# Patient Record
Sex: FEMALE | Race: BLACK OR AFRICAN AMERICAN | NOT HISPANIC OR LATINO | Employment: STUDENT | URBAN - METROPOLITAN AREA
[De-identification: names, ages, dates, MRNs, and addresses within clinical notes are randomized per-mention and may not be internally consistent; named-entity substitution may affect disease eponyms.]

---

## 2022-01-06 ENCOUNTER — PATIENT MESSAGE (OUTPATIENT)
Dept: ADMINISTRATIVE | Facility: OTHER | Age: 37
End: 2022-01-06

## 2022-01-06 ENCOUNTER — LAB VISIT (OUTPATIENT)
Dept: PRIMARY CARE CLINIC | Facility: OTHER | Age: 37
End: 2022-01-06
Attending: INTERNAL MEDICINE
Payer: OTHER GOVERNMENT

## 2022-01-06 DIAGNOSIS — Z20.822 ENCOUNTER FOR LABORATORY TESTING FOR COVID-19 VIRUS: ICD-10-CM

## 2022-01-06 PROCEDURE — U0003 INFECTIOUS AGENT DETECTION BY NUCLEIC ACID (DNA OR RNA); SEVERE ACUTE RESPIRATORY SYNDROME CORONAVIRUS 2 (SARS-COV-2) (CORONAVIRUS DISEASE [COVID-19]), AMPLIFIED PROBE TECHNIQUE, MAKING USE OF HIGH THROUGHPUT TECHNOLOGIES AS DESCRIBED BY CMS-2020-01-R: HCPCS | Mod: ST72 | Performed by: INTERNAL MEDICINE

## 2022-01-11 LAB
SARS-COV-2 RNA RESP QL NAA+PROBE: NORMAL
TEST PERFORMANCE INFO SPEC: NORMAL

## 2022-08-23 ENCOUNTER — HOSPITAL ENCOUNTER (OUTPATIENT)
Dept: PHYSICAL THERAPY | Age: 37
Discharge: HOME | End: 2022-08-23
Payer: COMMERCIAL

## 2022-08-23 PROCEDURE — 97162 PT EVAL MOD COMPLEX 30 MIN: CPT

## 2022-08-23 PROCEDURE — 97535 SELF CARE MNGMENT TRAINING: CPT

## 2022-08-23 NOTE — PROGRESS NOTES
[facility]  [address], [address]  Ph [phone]  Fx [phone]    Plan of Care/ Statement of Necessity for Physical Therapy Services    Patient name: Manan Martinez Start of Care: 2022   Referral source: Basia Hurtado MD : 1985    Medical Diagnosis: Other signs and symptoms involving the genitourinary system R39. 8   Onset Date: 2020    Treatment Diagnosis: Other signs and symptoms involving the genitourinary system R39. 8   Prior Hospitalization: see medical history Provider#: [id number]   Medications: Verified on Patient summary List    Comorbidities: endometriosis    Prior Level of Function: Able to have intercourse without pain, no urinary urgency              The Plan of Care and following information is based on the information from the initial evaluation. Assessment/ key information: Patient is a 29year old female presenting to Physical Therapy with c/o urge urinary incontinence and pelvic pain which is limiting ability function at previous level. Patient has pelvic pain complaints with pelvic examinations, intercourse, and occasionally with ADLs. Patient presents with hypertonicity of pelvic floor and scar tissue restrictions at umbilicus. Patient has history of endometriosis require extensive surgery. 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 Patient presents with decreased understanding of bladder and bowel anatomy/function, dietary irritants, and urge suppression. Patient would benefit from skilled therapeutic intervention to optimize highest functional level possible.      Evaluation Complexity History MEDIUM  Complexity : 1-2 comorbidities / personal factors will impact the outcome/ POC ; Examination MEDIUM Complexity : 3 Standardized tests and measures addressing body structure, function, activity limitation and / or participation in recreation  ;Presentation MEDIUM Complexity : Evolving with changing characteristics  ; Clinical Decision Making MEDIUM Complexity : FOTO score of 26-74 FOTO score = an established functional score where 100 = no disability  Overall Complexity Rating: MEDIUM    Problem List: Pelvic pain/dysfunction, Decreased pelvic floor mm awareness, Decreased pelvic floor mm strength, Use of accessory muscles, Improper voiding habits, Hypertonus of pelvic floor, and Urinary urgency  Treatment Plan may include any combination of the following: Therapeutic exercise, Urge suppression techniques, Neuromuscular re-education, Manual therapy, Physical agent/modality, and Patient education  Patient / Family readiness to learn indicated by: asking questions  Persons(s) to be included in education: patient (P)  Barriers to Learning/Limitations: None  Measures taken if barriers to learning: N/a  Patient Goal (s): I want to learn why my body does the things it does  Patient Self Reported Health Status: good  Rehabilitation Potential: good    Short term goals: To be achieved in 6 weeks:  Patient will demonstrate proper dietary/fluid habits and urge suppression strategies that promote bladder and bowel health to aid in management of urinary urgency and incontinence. Status at eval: Patient consuming 40-80 oz of water and unaware of bladder fitness, dietary irritants and urge suppression strategies. Patient will report improvement in UI complaints evidence by a decrease in pad usage and/or amount of leakage by 25% to improve QOL. Status at eval: Patient using 1-3 pads (pantiliners) per day, generally dry-damp (amount of leakage). Patient will be able to fully relax pelvic floor to baseline with sensations of release to assist in pelvic floor hypertonicity training. Status at eval: Patient has poor release with no sensation of release following pelvic floor activation.      Patient will demonstrate proper toileting posture and techniques to aid in bowel movement, with 50% decrease in report of pain and straining. Status at eval: standard toileting posture, pain and pressure with bowel movements     Pt will demonstrate proper toileting posture and perform colon massage daily for improved bowel motility to improve QOL and prevent weakening of PFM due to straining with BMs. Status at eval: BM every 1x/week, Type 1     Long term goals: To be achieved in 12 weeks:  Patient will report bladder continence 100% of the time with cough/sneeze/laugh and walking to the toilet. Status at eval: patient stress and urgency incontinence 1-2 times  per week     Patient will tolerate size 3 dilator, dynamic insertion for 10 minutes (or intercourse) with pain no more than 4/10  Status at eval: digital insertion for pelvic floor examination elicits 9/80 pain, intercourse 5/10 pain      Patient will demonstrate improvement of current complaints evidenced by a 9 point  improvement in FOTO, Pelvic functional disability index score  Status at Eval: Pelvic functional disability index urinary 56     Patient will demonstrate independence with management tools and exercise program that are beneficial for current condition in order to feel comfortable with Pelvic floor PT D/C and not fear exacerbation of current condition or symptoms returning. Status at eval : patient fearful of return to exercise and unaware of what activities to avoid to avoid exacerbation of current condition    Frequency / Duration: Patient to be seen 1 times per week for 12 weeks. Patient/ Caregiver education and instruction: Diagnosis, prognosis, Proper Voiding Habits, Diet, Pain Management, Exercises, and Bladder Retraining   [x]  Plan of care has been reviewed with JITENDRA Burks, PT 8/23/2022 6:38 PM    ________________________________________________________________________    I certify that the above Therapy Services are being furnished while the patient is under my care.  I agree with the treatment plan and certify that this therapy is necessary.     Physician's Signature:____________________  Date:____________Time:____________                                      Dana Mckenna MD      Please sign and return to [facility]  [address], [address]  Ph [phone]  Fx [phone]

## 2022-08-23 NOTE — PROGRESS NOTES
Physical Therapy Evaluation      Patient Name: Mono Kidd  Date:2022  : 1985  [x]  Patient  Verified  Payor: BLUE CROSS / Plan: [employer] [id number] PPO / Product Type: PPO /    In time:5:06  Out time:5:50  Total Treatment Time (min): 44  Visit #: 1 of 12    Medicare/BCBS Only   Total Timed Codes (min):  14 1:1 Treatment Time:  44       Treatment Area: Pelvic pain [R10.2]    SUBJECTIVE  Pain Level (0-10 scale): 2/10  []constant []intermittent []improving []worsening []no change since onset    Any medication changes, allergies to medications, adverse drug reactions, diagnosis change, or new procedure performed?: [x] No    [] Yes (see summary sheet for update)  Subjective functional status/changes:     PLOF: Able to have intercourse without pain, no urinary urgency   Limitations to PLOF: Daily urinary urgency, weekly urinary leakage, pain with intercourse and pelvic examinations  Mechanism of Injury: symptoms worsened after endometrial ablation in 2020  Current symptoms/Complaints: Patient states she noticed increased urinary leakage/urgency. She states she tried to google her symptoms and starting completing pelvic floor kegels. Symptoms were not improving so she sought out a pelvic floor referral and is concerned she is too tight. She has been diagnosed with endometriosis. Patient does have pain during intercourse.  She was also told she had a tilted uterus at her last pelvic exam.    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[employer] Situation: Lives with finance   Pt Goals: \"I want to learn why my body does the things it does\"   Barriers: []pain []financial []time []transportation []other  Motivation: highly motivated  Substance use: []Alcohol []Tobacco [x]none  Cognition: A & O x 3    Other:    Current urinary complaint  leaks with urgency  urinary frequency  urinary urgency  dysuria: pain during void    Bladder complaint longevity:  1 - 3 years    Bladder symptom progression:  worsened    Frequency of UI: 1-2 times per week    Pad use:  pantiliners: 1- 2 per day    Pad wetness when changed:  Damp 1-2 times per week    Daytime urinary frequency:  Every 2-3 hour(s) during the day    Nocturia:  0-1x/ night    Patient has failed previous pelvic floor muscle training? [x] Yes    [] No    Bowel function:  Constipation,  less than 3 per week, hard stool with straining  Uses \"smooth moves\" tea x1/month to help relieve constipation  Stool Type [facility]): Type 1  Toileting position/modifications: none    Fecal Incontinence present? No    Pain complaint:  suprapubic pain/discomfort  vaginal pain: sharp, stabbing and only with intercourse, pelvic exam    Pain scale:  Verbal Analog scale: best 0/10 worst 5/10    Pain description:  worsens with: sexual activity (intercourse, ejaculation, orgasm)    Diet:    Fluid intake:    Fluid  Amount per day   Water 40-80 oz   Caffeine occasionally   Alcohol no             Physical Exam Objective Findings:    scar adhesions/restrictions noted at area where umbilicus was located  Pain present? no    Pelvic Floor Assessment  Patient was educated on pelvic floor anatomy, structure, and function and implications for current presentation of s/s. Patient consents to pelvic floor assessment.     External trigger point/ muscle tenderness:    Superficial PFM tenderness/restriction   Right Left   Bulbocavernosus (bulbospongiosus) No No   Ischiocavernosus No No   Superficial Transverse Perineal No No   Perineal body No    Levator Ani No No              PFM Screen:    Skin Integrity:  [x] Healthy [] Red  [] Labia Atrophy [] Fragile    Sensation: [x] Intact [] Diminished:    Muscle Bulk: [x] Symmetrical  [] Well-developed [] Atrophied:  []L   []R   []B    Prolapse: [] Cystocele:   [] Rectocele:  [] Uterine prolapse:     Ability to actively bulge: yes  Bulge with cough no    Pelvic floor manual exam: Performed via internal digital vaginal assessment  female-upon vaginal palpation of the pelvic floor, the following was noted: moderate hypertonicity throughout with painful palpation but tolerated  Introitus restriction/TTP (reported as hands on a clock): 4,5,7,8  Moderate TTP/resriction: deep pelvic floor    Deep PFM Tenderness/Restriction:    Right Left   pubococcygeus 4/10 4/10   Ischiococcygeus 5/10 5/10   Iliococcygeus 4/10 5/10   Obturator Internus 1/10 3/10   coccyx No             Pelvic floor MMT    PERF (Performance/Endurance/Repetitions//Flicks): 4/5 muscle strength, did not assess pelvic floor endurance secondary to hypertonicity     Pelvic muscle release pattern  1 - poor release, no sensation of release      30 min [x]Eval                  []Re-Eval       14 min Self Care: Review and handout provided on the following: PFM anatomy, structure and function as it pertains to current s/s, complaints and condition. Reviewed expectations for PFPT and POC. Rationale:  Increase awareness and understanding of current condition to improve patients ability to independently and effectively attain goals and progress towards long term management of current condition. With   [] TE   [] TA   [] neuro   [] other: Patient Education: [x] Review HEP    [] Progressed/Changed HEP based on:   [] positioning   [] body mechanics   [] transfers   [] heat/ice application    [] other:        Pain Level (0-10 scale) post treatment: 2/10    ASSESSMENT/Changes in Function: Patient is a 29year old female presenting to Physical Therapy with c/o urge urinary incontinence and pelvic pain which is limiting ability function at previous level.  Patient has pelvic pain complaints with pelvic examinations, intercourse, and occasionally with ADLs. Patient presents with hypertonicity of pelvic floor and scar tissue restrictions at umbilicus. Patient has history of endometriosis require extensive surgery. 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 Patient presents with decreased understanding of bladder and bowel anatomy/function, dietary irritants, and urge suppression. Patient would benefit from skilled therapeutic intervention to optimize highest functional level possible. Patient will continue to benefit from skilled PT services to modify and progress therapeutic interventions, address functional mobility deficits, address ROM deficits, address strength deficits, analyze and address soft tissue restrictions, analyze and cue movement patterns, analyze and modify body mechanics/ergonomics, assess and modify postural abnormalities, address imbalance/dizziness, and instruct in home and community integration to attain remaining goals. [x]  See Plan of Care  []  See progress note/recertification  []  See Discharge Summary         Progress towards goals / Updated goals:  Short term goals: To be achieved in 6 weeks:  Patient will demonstrate proper dietary/fluid habits and urge suppression strategies that promote bladder and bowel health to aid in management of urinary urgency and incontinence. Status at eval: Patient consuming 40-80 oz of water and unaware of bladder fitness, dietary irritants and urge suppression strategies. Patient will report improvement in UI complaints evidence by a decrease in pad usage and/or amount of leakage by 25% to improve QOL. Status at eval: Patient using 1-3 pads (pantiliners) per day, generally dry-damp (amount of leakage). Patient will be able to fully relax pelvic floor to baseline with sensations of release to assist in pelvic floor hypertonicity training.    Status at eval: Patient has poor release with no sensation of release following pelvic floor activation. Patient will demonstrate proper toileting posture and techniques to aid in bowel movement, with 50% decrease in report of pain and straining. Status at eval: standard toileting posture, pain and pressure with bowel movements    Pt will demonstrate proper toileting posture and perform colon massage daily for improved bowel motility to improve QOL and prevent weakening of PFM due to straining with BMs. Status at eval: BM every 1x/week, Type 1    Long term goals: To be achieved in 12 weeks:    Patient will report bladder continence 100% of the time with cough/sneeze/laugh and walking to the toilet. Status at eval: patient stress and urgency incontinence 1-2 times  per week    Patient will tolerate size 3 dilator, dynamic insertion for 10 minutes (or intercourse) with pain no more than 4/10  Status at eval: digital insertion for pelvic floor examination elicits 1/68 pain, intercourse 5/10 pain     Patient will demonstrate improvement of current complaints evidenced by a 9 point  improvement in FOTO, Pelvic functional disability index score  Status at Eval: Pelvic functional disability index urinary 56    Patient will demonstrate independence with management tools and exercise program that are beneficial for current condition in order to feel comfortable with Pelvic floor PT D/C and not fear exacerbation of current condition or symptoms returning.    Status at eval : patient fearful of return to exercise and unaware of what activities to avoid to avoid exacerbation of current condition    PLAN  []  Upgrade activities as tolerated     [x]  Continue plan of care  []  Update interventions per flow sheet       []  Discharge due to:_  []  Other:_      Fouzia Kwok, PT 8/23/2022  4:54 PM

## 2022-08-30 ENCOUNTER — HOSPITAL ENCOUNTER (OUTPATIENT)
Dept: PHYSICAL THERAPY | Age: 37
Discharge: HOME | End: 2022-08-30
Payer: COMMERCIAL

## 2022-08-30 PROCEDURE — 97535 SELF CARE MNGMENT TRAINING: CPT

## 2022-08-30 PROCEDURE — 97110 THERAPEUTIC EXERCISES: CPT

## 2022-08-30 PROCEDURE — 97530 THERAPEUTIC ACTIVITIES: CPT

## 2022-08-30 NOTE — PROGRESS NOTES
PF DAILY TREATMENT NOTE    Patient Name: Manan Martinez  Date:2022  : 1985  [x]  Patient  Verified  Payor: BLUE BETTY / Plan: Lux Bradford [id number] PPO / Product Type: PPO /    In time:800  Out time:849  Total Treatment Time (min): 49    For [facility]/BCBS only  Total Timed Codes (min): 49  Of Timed Code minutes, 1:1 Treatment Time: 52     Visit #: 2 of 12    Treatment Area: Pelvic pain [R10.2]    SUBJECTIVE  Pain Level (0-10 scale): 0/10  Any medication changes, allergies to medications, adverse drug reactions, diagnosis change, or new procedure performed?: [x] No    [] Yes (see summary sheet for update)  Subjective functional status/changes:   [x] No changes reported    Patient reports trying to use the belly massage and reports increased ease with defecating. OBJECTIVE        15 min Therapeutic Exercise:  [] See flow sheet :   []  Pelvic floor strengthening                 [x]  Pelvic floor downtraining  []  Quality pelvic floor contractions       [x]  Relaxation techniques  []  Urge suppression exercises  []  Other:  Rationale: increase ROM and improve coordination  to improve the patients ability to decrease pain with intercourse       25 min Therapeutic Activity:  []  See flow sheet :    []  Increase Tissue extensibility        []  Assess fiber intake    []  Assess voiding habits  []  Assess bowel habits  []  Other: Musculoskeletal assessment  Rationale: musculoskeletal assessment was completed to determine strength, coordination, and tenderness that may impact the pelvic floor            9 min Self Care: Instructed on use of vaginal trainers and wands.   XS  rigid  was given to the patient   Rationale:    increase ROM and improve coordination to improve the patients ability to decrease pain with intercourse         With   [] TE   [] TA   [] neuro  [] manual  [] self care   [] other: Patient Education: [x] Review HEP    [] Progressed/Changed HEP based on:   [] positioning   [] body mechanics   [] transfers   [] heat/ice application    [] other:      Other Objective/Functional Measures:     Musculoskeletal assessment:    Gait:  [x] Normal   (slightly more pronation on left), right foot slightly more pes planus in stance  [] Abnormal:      Active Movements: [] N/A   [] Too acute   [] Other:  ROM % AROM Comments:pain, area   Forward flexion 40-60 WNL    Extension 20-30 WNL    SideBend right 20-30 WNL    SideBend left 20-30 WNL    Rotation right 5-10 WNL    Rotation left 5-10 WNL          Neuro Screen [x] WNL      Dural Mobility:  SLR Supine: [] R    [] L    [] +    [x] -  @ (degrees):       Palpation  No pain with palpation     Strength   L(0-5) R (0-5) N/T   Hip Flexion (L1,2) 5 5 []   Knee Extension (L3,4) 5 5 []   Ankle Dorsiflexion (L4) 5 5 []   Great Toe Extension (L5) 5 5 []   Ankle Plantarflexion (S1) 5 5 []   Knee Flexion (S1,2) 5 5 []   Abdominals 4  []   Paraspinals 5  []   Back Rotators   [x]   Gluteus Marcus 5 5 []   Hip Abduction 5 5 []         Special Tests  SLS: 30 sec bilateral lower extremity  Sternocostal angle:  less than 90 degrees  Jay: negative    Sacroilliac:       Compression: [] +    [x] -     Gapping:  [] +    [x] -           Hip:  Prudence Libel:  [] R    [] L    [] +    [x] -     FADIR:  [] R    [] L    [] +    [x] -     Piriformis: [] R    [] L    [] +    [x] -         Other Tests / Comments: RUSLAN assessment: with head raise 2 fingers at umbilicus; 1\" above umbilicus 1 fingers; 1\" below umbilicus 1 fingers; good tensile force noted and no abdominal doming noted with transfers/head raise     Pain Level (0-10 scale) post treatment: 0/10    ASSESSMENT/Changes in Function: Patient tolerated today's session well with no c/o pain. A musculoskeletal assessment was performed to determine if there are contributing factors to  her pelvic pain. Patient was instructed on use of pelvic wands and vaginal dilators (trainers). An extra small rigid dilator was given to the patient. Patient demonstrated understanding of today's instruction, education, and recommendations. Patient is progressing appropriately towards goals. Patient will continue to benefit from skilled PT services to modify and progress therapeutic interventions, address functional mobility deficits, address ROM deficits, address strength deficits, analyze and address soft tissue restrictions, analyze and cue movement patterns, analyze and modify body mechanics/ergonomics, and assess and modify postural abnormalities to attain remaining goals. [x]  See Plan of Care  []  See progress note/recertification  []  See Discharge Summary         Progress towards goals / Updated goals:  Short term goals: To be achieved in 6 weeks:  Patient will demonstrate proper dietary/fluid habits and urge suppression strategies that promote bladder and bowel health to aid in management of urinary urgency and incontinence. Status at eval: Patient consuming 40-80 oz of water and unaware of bladder fitness, dietary irritants and urge suppression strategies. Patient will report improvement in UI complaints evidence by a decrease in pad usage and/or amount of leakage by 25% to improve QOL. Status at eval: Patient using 1-3 pads (pantiliners) per day, generally dry-damp (amount of leakage). Patient will be able to fully relax pelvic floor to baseline with sensations of release to assist in pelvic floor hypertonicity training. Status at eval: Patient has poor release with no sensation of release following pelvic floor activation. Current:  Patient was instructed on diaphragmatic breathing with awareness of movement of the pelvic floor. 8/30/2022 progressing     Patient will demonstrate proper toileting posture and techniques to aid in bowel movement, with 50% decrease in report of pain and straining.   Status at eval: standard toileting posture, pain and pressure with bowel movements     Pt will demonstrate proper toileting posture and perform colon massage daily for improved bowel motility to improve QOL and prevent weakening of PFM due to straining with BMs. Status at eval: BM every 1x/week, Type 1     Long term goals: To be achieved in 12 weeks:  Patient will report bladder continence 100% of the time with cough/sneeze/laugh and walking to the toilet. Status at eval: patient stress and urgency incontinence 1-2 times  per week     Patient will tolerate size 3 dilator, dynamic insertion for 10 minutes (or intercourse) with pain no more than 4/10  Status at eval: digital insertion for pelvic floor examination elicits 1/29 pain, intercourse 5/10 pain   Current:  Patient was given an XS vaginal dilator 0.5\" diameter) and instructed on its use. 8/30/2022     Patient will demonstrate improvement of current complaints evidenced by a 9 point  improvement in FOTO, Pelvic functional disability index score  Status at Eval: Pelvic functional disability index urinary 56     Patient will demonstrate independence with management tools and exercise program that are beneficial for current condition in order to feel comfortable with Pelvic floor PT D/C and not fear exacerbation of current condition or symptoms returning.    Status at eval : patient fearful of return to exercise and unaware of what activities to avoid to avoid exacerbation of current condition    PLAN  []  Upgrade activities as tolerated     []  Continue plan of care  []  Update interventions per flow sheet       []  Discharge due to:_  []  Other:_      Nelson Robison PT 8/30/2022  7:58 AM    Future Appointments   Date Time Provider Benjamin Magaña   8/30/2022  8:00 AM Helen Magallanes PT [facility]   9/9/2022  8:00 AM Helen Magallanes PT [facility]   9/13/2022  5:00 PM Sarai Pippins, [address] [facility]   9/20/2022  5:00 PM Sarai Pippins, [address] [facility]   9/27/2022  5:00 PM Sarai Pippins, [address] [facility]   10/4/2022  5:00 PM Jose Fleming, [address] [facility]   10/11/2022  5:00 PM Gabbie Hood Leslie [facility]   10/18/2022  5:00 PM Fred Shivers, [address] [facility]   10/25/2022  5:00 PM Fred Shivers, [address] [facility]   11/1/2022  5:00 PM Fred Shivers, [address] [facility]   11/8/2022  5:00 PM Fred Shivers, [address] [facility]   11/15/2022  5:00 PM Fred Shivers, [address] [facility]

## 2022-09-09 ENCOUNTER — HOSPITAL ENCOUNTER (OUTPATIENT)
Dept: PHYSICAL THERAPY | Age: 37
Discharge: HOME | End: 2022-09-09
Payer: COMMERCIAL

## 2022-09-09 PROCEDURE — 97110 THERAPEUTIC EXERCISES: CPT

## 2022-09-09 PROCEDURE — 97112 NEUROMUSCULAR REEDUCATION: CPT

## 2022-09-09 NOTE — PROGRESS NOTES
PF DAILY TREATMENT NOTE    Patient Name: Hazel Shah  Date:2022  : 1985  [x]  Patient  Verified  Payor: BLUE CROSS / Plan: Lux Bradford [id number] PPO / Product Type: PPO /    In time:805  Out time:848  Total Treatment Time (min): 43    For [facility] only  Total Timed Codes (min): 43  Of Timed Code minutes, 1:1 Treatment Time: 43     Visit #: 3 of 12    Treatment Area: Pelvic pain [R10.2]    SUBJECTIVE  Pain Level (0-10 scale): 0/10  Any medication changes, allergies to medications, adverse drug reactions, diagnosis change, or new procedure performed?: [x] No    [] Yes (see summary sheet for update)  Subjective functional status/changes:   [x] No changes reported    Patient reports using the XS vaginal dilator with slight discomfort at the beginning of the session.     OBJECTIVE        10 min Therapeutic Exercise:  [] See flow sheet :   []  Pelvic floor strengthening                 [x]  Pelvic floor downtraining  []  Quality pelvic floor contractions       [x]  Relaxation techniques  []  Urge suppression exercises  []  Other: KPFE installation and explanation  Rationale: increase ROM, increase strength, and improve coordination  to improve the patients ability to decrease pelvic pain and to decrease incontinence             33 min Neuromuscular Re-education:  []  See flow sheet :   []  Pelvic floor strengthening                 []  Pelvic floor downtraining  []  Quality pelvic floor contractions       []  Relaxation techniques  []  Urge suppression exercises  [x]  Other: surface EMG  Rationale: Biofeedback was performed to determine the pelvic floor resting \"tone\", motor unit recruitment, endurance, and ability to relax from a work state to guide the treatments for the patient         With   [] TE   [] TA   [] neuro  [] manual  [] self care   [] other: Patient Education: [x] Review HEP    [] Progressed/Changed HEP based on:   [] positioning   [] body mechanics   [] transfers   [] heat/ice application    [] other:      Other Objective/Functional Measures:   Biofeedback expectations and implications were reviewed with patient prior to intervention,   Performed sEMG with perianal electrodes as follows:    Position, initial resting tone Work/Rest/Reps Comments   Sitting   5/10/10     Ave Resting at 3.6   uV    Min resting at  0.8   uV    Ave Work at   22.1     uV    Max Work at 58.8 uV    W-R interval 18.49 uV   Sitting   2/4/10   Ave Resting at 6.6   uV    Min resting at  0.9   [place] Work at   22.9     uV    Max Work at 52.1 uV    W-R [id number] uV       Baseline prior to exercise in supine: Ave resting=  1.5  uV;  Min resting= 1.0  uV; Max resting= 4.4  uV  Baseline prior to exercise in sitting: Ave resting= 3.5 uV ; Min resting=  2.5   uV; Max resting= 5.0 uV    Baseline post exercise in sitting:  Ave 1.0 uV; Min = 0.5uV; Max = 2.6 uV    Pain Level (0-10 scale) post treatment: 0/10    ASSESSMENT/Changes in Function: Patient tolerated today's session well with no c/o pain. Patient consented to surface EMG study. Patient demonstrates excellent ability to relax her pelvic floor. Patient demonstrated good ability to recruit the motor units but decreased endurance. Patient demonstrated understanding of today's instruction, education, and recommendations. Patient is progressing appropriately towards goals. Patient will continue to benefit from skilled PT services to modify and progress therapeutic interventions, address functional mobility deficits, address ROM deficits, address strength deficits, analyze and address soft tissue restrictions, analyze and cue movement patterns, analyze and modify body mechanics/ergonomics, and assess and modify postural abnormalities to attain remaining goals. [x]  See Plan of Care  []  See progress note/recertification  []  See Discharge Summary         Progress towards goals / Updated goals:  Short term goals:  To be achieved in 6 weeks:  Patient will demonstrate proper dietary/fluid habits and urge suppression strategies that promote bladder and bowel health to aid in management of urinary urgency and incontinence. Status at eval: Patient consuming 40-80 oz of water and unaware of bladder fitness, dietary irritants and urge suppression strategies. Patient will report improvement in UI complaints evidence by a decrease in pad usage and/or amount of leakage by 25% to improve QOL. Status at eval: Patient using 1-3 pads (pantiliners) per day, generally dry-damp (amount of leakage). Patient will be able to fully relax pelvic floor to baseline with sensations of release to assist in pelvic floor hypertonicity training. Status at eval: Patient has poor release with no sensation of release following pelvic floor activation. Current:  Patient was instructed on diaphragmatic breathing with awareness of movement of the pelvic floor. 8/30/2022 progressing     Patient will demonstrate proper toileting posture and techniques to aid in bowel movement, with 50% decrease in report of pain and straining. Status at eval: standard toileting posture, pain and pressure with bowel movements     Pt will demonstrate proper toileting posture and perform colon massage daily for improved bowel motility to improve QOL and prevent weakening of PFM due to straining with BMs. Status at eval: BM every 1x/week, Type 1     Long term goals: To be achieved in 12 weeks:  Patient will report bladder continence 100% of the time with cough/sneeze/laugh and walking to the toilet. Status at eval: patient stress and urgency incontinence 1-2 times  per week     Patient will tolerate size 3 dilator, dynamic insertion for 10 minutes (or intercourse) with pain no more than 4/10  Status at eval: digital insertion for pelvic floor examination elicits 6/10 pain, intercourse 5/10 pain   Current:  Patient was given an XS vaginal dilator 0.5\" diameter) and instructed on its use. 8/30/2022  Current:  Patient reports using the XS vaginal  with slight discomfort. 9/9/2022 Progressing     Patient will demonstrate improvement of current complaints evidenced by a 9 point  improvement in FOTO, Pelvic functional disability index score  Status at Eval: Pelvic functional disability index urinary 56     Patient will demonstrate independence with management tools and exercise program that are beneficial for current condition in order to feel comfortable with Pelvic floor PT D/C and not fear exacerbation of current condition or symptoms returning.    Status at eval : patient fearful of return to exercise and unaware of what activities to avoid to avoid exacerbation of current condition    PLAN  []  Upgrade activities as tolerated     [x]  Continue plan of care  []  Update interventions per flow sheet       []  Discharge due to:_  []  Other:_      Gretchen Kern, PT 9/9/2022  8:13 AM    Future Appointments   Date Time Provider Benjamin Magaña   9/13/2022  5:00 PM Wilhelmenia Sida, [address] [facility]   9/20/2022  5:00 PM Wilhelmenia Sida, [address] [facility]   9/27/2022  5:00 PM Wilhelmenia Sida, [address] [facility]   10/4/2022  5:00 PM Simmie Le, [address] [facility]   10/11/2022  5:00 PM Simmie Le, [address] [facility]   10/18/2022  5:00 PM Simmie Le, [address] [facility]   10/25/2022  5:00 PM Simmie Le, [address] [facility]   11/1/2022  5:00 PM Simmie Le, [address] [facility]   11/8/2022  5:00 PM Simmie Le, [address] [facility]   11/15/2022  5:00 PM Simmie Le, [address] [facility]

## 2022-09-13 ENCOUNTER — HOSPITAL ENCOUNTER (OUTPATIENT)
Dept: PHYSICAL THERAPY | Age: 37
Discharge: HOME | End: 2022-09-13
Payer: COMMERCIAL

## 2022-09-13 PROCEDURE — 97110 THERAPEUTIC EXERCISES: CPT

## 2022-09-13 PROCEDURE — 97112 NEUROMUSCULAR REEDUCATION: CPT

## 2022-09-13 NOTE — PROGRESS NOTES
PF DAILY TREATMENT NOTE    Patient Name: Betsy Tapia  Date:2022  : 1985  [x]  Patient  Verified  Payor: BLUE CROSS / Plan: Lux Bradford [id number] PPO / Product Type: PPO /    In time:5:08  Out time:5:47  Total Treatment Time (min): 39    For MC/BCBS only  Total Timed Codes (min): 39  Of Timed Code minutes, 1:1 Treatment Time: 39     Visit #: 4 of 12    Treatment Area: Pelvic pain [R10.2]    SUBJECTIVE  Pain Level (0-10 scale): 0/10  Any medication changes, allergies to medications, adverse drug reactions, diagnosis change, or new procedure performed?: [x] No    [] Yes (see summary sheet for update)  Subjective functional status/changes:   [x] No changes reported  Patient states some improvement in urgency. She did have worsening of symptoms of urgency from baseline a week ago. OBJECTIVE    25 min Therapeutic Exercise:  [] See flow sheet :   []  Pelvic floor strengthening                 []  Pelvic floor downtraining  []  Quality pelvic floor contractions       []  Relaxation techniques  []  Urge suppression exercises  []  Other:  Rationale: increase ROM and increase strength  to improve the patients ability to restore PLOF. 14 min Neuromuscular Re-education:  []  See flow sheet :   [x]  Pelvic floor strengthening                 [x]  Pelvic floor downtraining  []  Quality pelvic floor contractions       []  Relaxation techniques  []  Urge suppression exercises  []  Other:  Rationale: increase strength, improve coordination, and increase proprioception  to improve the patients ability to activate abdominals without compensation.         With   [] TE   [] TA   [] neuro  [] manual  [] self care   [] other: Patient Education: [x] Review HEP    [] Progressed/Changed HEP based on:   [] positioning   [] body mechanics   [] transfers   [] heat/ice application    [] other:      Other Objective/Functional Measures: see goals    Pain Level (0-10 scale) post treatment: 0/10    ASSESSMENT/Changes in Function: Patient tolerated treatment session well today. Introduced TA isolation exercises and gluteal strengthening with clams and bridges. Patient had no increase in pain/urgency with exercises. She did quickly fatigue with TA isolation but had good proprioception of activation vs. Relaxation. Patient given updated HEP. Patient will continue to benefit from skilled PT services to modify and progress therapeutic interventions, address functional mobility deficits, address ROM deficits, address strength deficits, analyze and address soft tissue restrictions, analyze and cue movement patterns, analyze and modify body mechanics/ergonomics, assess and modify postural abnormalities, address imbalance/dizziness, and instruct in home and community integration to attain remaining goals. [x]  See Plan of Care  []  See progress note/recertification  []  See Discharge Summary         Progress towards goals / Updated goals:    Short term goals: To be achieved in 6 weeks:  Patient will demonstrate proper dietary/fluid habits and urge suppression strategies that promote bladder and bowel health to aid in management of urinary urgency and incontinence. Status at eval: Patient consuming 40-80 oz of water and unaware of bladder fitness, dietary irritants and urge suppression strategies. Patient will report improvement in UI complaints evidence by a decrease in pad usage and/or amount of leakage by 25% to improve QOL. Status at eval: Patient using 1-3 pads (pantiliners) per day, generally dry-damp (amount of leakage). Current: Patient using 1-3 liners per day, that have been dry most of the time progressing 9/13/22     Patient will be able to fully relax pelvic floor to baseline with sensations of release to assist in pelvic floor hypertonicity training. Status at eval: Patient has poor release with no sensation of release following pelvic floor activation.   Current:  Patient was instructed on diaphragmatic breathing with awareness of movement of the pelvic floor. 8/30/2022 progressing     Patient will demonstrate proper toileting posture and techniques to aid in bowel movement, with 50% decrease in report of pain and straining. Status at eval: standard toileting posture, pain and pressure with bowel movements     Pt will demonstrate proper toileting posture and perform colon massage daily for improved bowel motility to improve QOL and prevent weakening of PFM due to straining with BMs. Status at eval: BM every 1x/week, Type 1     Long term goals: To be achieved in 12 weeks:  Patient will report bladder continence 100% of the time with cough/sneeze/laugh and walking to the toilet. Status at eval: patient stress and urgency incontinence 1-2 times  per week     Patient will tolerate size 3 dilator, dynamic insertion for 10 minutes (or intercourse) with pain no more than 4/10  Status at eval: digital insertion for pelvic floor examination elicits 5/40 pain, intercourse 5/10 pain   Current:  Patient was given an XS vaginal dilator 0.5\" diameter) and instructed on its use. 8/30/2022  Current:  Patient reports using the XS vaginal  with slight discomfort. 9/9/2022 Progressing     Patient will demonstrate improvement of current complaints evidenced by a 9 point  improvement in FOTO, Pelvic functional disability index score  Status at Eval: Pelvic functional disability index urinary 56     Patient will demonstrate independence with management tools and exercise program that are beneficial for current condition in order to feel comfortable with Pelvic floor PT D/C and not fear exacerbation of current condition or symptoms returning.    Status at eval : patient fearful of return to exercise and unaware of what activities to avoid to avoid exacerbation of current condition    PLAN  [x]  Upgrade activities as tolerated     [x]  Continue plan of care  []  Update interventions per flow sheet       []  Discharge due to:_  []  Other:_      Stephanie Khan, PT 9/13/2022  10:13 AM    Future Appointments   Date Time Provider Benjamin Buckleyi   9/13/2022  5:00 PM Grupo Sieving, [address] [facility]   9/20/2022  5:00 PM Grupo Sieving, [address] [facility]   9/27/2022  5:00 PM Grupo Sieving, [address] [facility]   10/4/2022  5:00 PM Mehran Sessions, [address] [facility]   10/11/2022  5:00 PM Mehran Sessions, [address] [facility]   10/18/2022  5:00 PM Mehran Sessions, [address] [facility]   10/25/2022  5:00 PM Mehran Sessions, [address] [facility]   11/1/2022  5:00 PM Mehran Sessions, [address] [facility]   11/8/2022  5:00 PM Mehran Sessions, [address] [facility]   11/15/2022  5:00 PM Mehran Sessions, [address] [facility]

## 2022-09-20 ENCOUNTER — HOSPITAL ENCOUNTER (OUTPATIENT)
Dept: PHYSICAL THERAPY | Age: 37
Discharge: HOME | End: 2022-09-20
Payer: COMMERCIAL

## 2022-09-20 PROCEDURE — 97112 NEUROMUSCULAR REEDUCATION: CPT

## 2022-09-20 PROCEDURE — 97110 THERAPEUTIC EXERCISES: CPT

## 2022-09-20 NOTE — PROGRESS NOTES
PF DAILY TREATMENT NOTE    Patient Name: Kyara Wheat  Date:2022  : 1985  [x]  Patient  Verified  Payor: BLUE CROSS / Plan: Lux Bradford [id number] PPO / Product Type: PPO /    In time:5:04  Out time:5:50  Total Treatment Time (min): 46    For MC/BCBS only  Total Timed Codes (min): 46  Of Timed Code minutes, 1:1 Treatment Time: 55     Visit #: 5 of 12    Treatment Area: Pelvic pain [R10.2]    SUBJECTIVE  Pain Level (0-10 scale): 1/10  Any medication changes, allergies to medications, adverse drug reactions, diagnosis change, or new procedure performed?: [x] No    [] Yes (see summary sheet for update)  Subjective functional status/changes:   [x] No changes reported  Patient had her cycle last week. She states the pain was lower than previous cycles. She was able to complete her work ADLs without having to go home which often happened with previous cycles. OBJECTIVE    21 min Therapeutic Exercise:  [x] See flow sheet :   []  Pelvic floor strengthening                 []  Pelvic floor downtraining  []  Quality pelvic floor contractions       []  Relaxation techniques  []  Urge suppression exercises  []  Other:  Rationale: increase ROM and increase strength  to improve the patients ability to restore PLOF. 25 min Neuromuscular Re-education:  [x]  See flow sheet :   []  Pelvic floor strengthening                 [x]  Pelvic floor downtraining  []  Quality pelvic floor contractions       [x]  Relaxation techniques  []  Urge suppression exercises  []  Other:  Rationale: increase strength, improve coordination, and increase proprioception  to improve the patients ability to activate TA without compensation.           With   [] TE   [] TA   [] neuro  [] manual  [] self care   [] other: Patient Education: [x] Review HEP    [] Progressed/Changed HEP based on:   [] positioning   [] body mechanics   [] transfers   [] heat/ice application    [] other:      Other Objective/Functional Measures: see goals Pain Level (0-10 scale) post treatment: 0/10    ASSESSMENT/Changes in Function: Patient is a 29year old female presenting to Physical Therapy with c/o urge urinary incontinence and pelvic pain. Patient has attended 5 physical therapy sessions. Patient reports improvements in urinary urgency and leakage. She has been working on increase void interval after initial urgency. She has been able to use urge suppression techniques and noting improvements. Patient reports improvement in pelvic pain during her cycle. She has not had improvements in pain with intercourse at this point. Patient would continue to benefit from skilled physical therapy to address short and long term goals. [x]  Decrease # of leaks   [] No change [x]  Improving [] Resolved     [x]  Decrease hypertonus [] No change [x]  Improving [] Resolved     [x]  Decrease # of pads [] No change [x]  Improving [] Resolved       Patient will continue to benefit from skilled PT services to modify and progress therapeutic interventions, address functional mobility deficits, address ROM deficits, address strength deficits, analyze and address soft tissue restrictions, analyze and cue movement patterns, analyze and modify body mechanics/ergonomics, assess and modify postural abnormalities, address imbalance/dizziness, and instruct in home and community integration to attain remaining goals. []  See Plan of Care  [x]  See progress note/recertification  []  See Discharge Summary         Progress towards goals / Updated goals:    Short term goals: To be achieved in 6 weeks:  Patient will demonstrate proper dietary/fluid habits and urge suppression strategies that promote bladder and bowel health to aid in management of urinary urgency and incontinence. Status at eval: Patient consuming 40-80 oz of water and unaware of bladder fitness, dietary irritants and urge suppression strategies. Current: Patient consuming 60-80 oz of water.  She has been working on urgency strategies and has increased to 3-5 minutes after initial urge Progressing 9/20/22     Patient will report improvement in UI complaints evidence by a decrease in pad usage and/or amount of leakage by 25% to improve QOL. Status at eval: Patient using 1-3 pads (pantiliners) per day, generally dry-damp (amount of leakage). Current: Patient using 0 liners per day goal met 9/20/22     Patient will be able to fully relax pelvic floor to baseline with sensations of release to assist in pelvic floor hypertonicity training. Status at eval: Patient has poor release with no sensation of release following pelvic floor activation. Current:  Patient was instructed on diaphragmatic breathing with awareness of movement of the pelvic floor. Did not assess pelvic floor internally today 9/20/2022 progressing     Patient will demonstrate proper toileting posture and techniques to aid in bowel movement, with 50% decrease in report of pain and straining. Status at eval: standard toileting posture, pain and pressure with bowel movements  Current: Patient states that she was noticing improvements in bowel movements but states that before her cycle she will note increased constipation; she notes improvement in pain/pressure with bowel movements Progressing 9/20/22     Pt will demonstrate proper toileting posture and perform colon massage daily for improved bowel motility to improve QOL and prevent weakening of PFM due to straining with BMs. Status at eval: BM every 1x/week, Type 1  Current: BM 3x/week, Type 3-4 Progressing 9/20/22     Long term goals: To be achieved in 12 weeks:  Patient will report bladder continence 100% of the time with cough/sneeze/laugh and walking to the toilet.    Status at eval: patient stress and urgency incontinence 1-2 times  per week  Current: Patient reports 0-1 times per week Progressing 9/20/22     Patient will tolerate size 3 dilator, dynamic insertion for 10 minutes (or intercourse) with pain no more than 4/10  Status at eval: digital insertion for pelvic floor examination elicits 0/21 pain, intercourse 5/10 pain   Current:  Patient was given an XS vaginal dilator 0.5\" diameter) and instructed on its use. 8/30/2022  Current:  Patient reports using the XS vaginal  with slight discomfort. Patient did not use this last week secondary to pain with menstruation  9/20/2022 Progressing     Patient will demonstrate improvement of current complaints evidenced by a 9 point  improvement in FOTO, Pelvic functional disability index score  Status at Eval: Pelvic functional disability index urinary 56  Current: Pelvic functional disability index urinary 62  9/20/2022 progressing     Patient will demonstrate independence with management tools and exercise program that are beneficial for current condition in order to feel comfortable with Pelvic floor PT D/C and not fear exacerbation of current condition or symptoms returning. Status at eval : patient fearful of return to exercise and unaware of what activities to avoid to avoid exacerbation of current condition  Current: Patient completing HEP 5x/weekly.  Progressing 9/20/22       PLAN  [x]  Upgrade activities as tolerated     [x]  Continue plan of care  []  Update interventions per flow sheet       []  Discharge due to:_  []  Other:_      Sundeep Burr, PT 9/20/2022  10:55 AM    Future Appointments   Date Time Provider Benjamin Magaña   9/20/2022  5:00 PM [facility], [address] THE [facility]   9/27/2022  5:00 PM [facility], [address] THE [facility]   10/4/2022  5:00 PM Dufm Booty, [address] THE [facility]   10/11/2022  5:00 PM Dufm Booty, [address] [facility]   10/18/2022  5:00 PM Dufm Booty, [address] [facility]   10/25/2022  5:00 PM Dufm Booty, [address] [facility]   11/1/2022  5:00 PM Dufm Booty, [address] [facility]   11/8/2022  5:00 PM Dufm Booty, [address] [facility]   11/15/2022  5:00 PM Dufm Booty, [address] [facility] THE [facility]

## 2022-09-20 NOTE — PROGRESS NOTES
[facility]  [address] [facility]  [facility], [address]  Ph [phone]  Fx [phone]    Physical Therapy Progress Note  Patient name: Brayan Alfaro Start of Care: 22   Referral source: Mirna Norton MD : 1985   Medical/Treatment Diagnosis: Pelvic pain [R10.2] Onset Date: 2020   Prior Hospitalization: see medical history Provider#: [id number]   Medications: Verified on Patient Summary List     Comorbidities: endometriosis    Prior Level of Function: Able to have intercourse without pain, no urinary urgency        Visits from Start of Care: 5    Missed Visits: 0    Updated Goals/Measure of Progress:     Short term goals: To be achieved in 6 weeks:  Patient will demonstrate proper dietary/fluid habits and urge suppression strategies that promote bladder and bowel health to aid in management of urinary urgency and incontinence. Status at eval: Patient consuming 40-80 oz of water and unaware of bladder fitness, dietary irritants and urge suppression strategies. Current: Patient consuming 60-80 oz of water. She has been working on urgency strategies and has increased to 3-5 minutes after initial urge Progressing 22     Patient will report improvement in UI complaints evidence by a decrease in pad usage and/or amount of leakage by 25% to improve QOL. Status at eval: Patient using 1-3 pads (pantiliners) per day, generally dry-damp (amount of leakage). Current: Patient using 0 liners per day goal met 22     Patient will be able to fully relax pelvic floor to baseline with sensations of release to assist in pelvic floor hypertonicity training. Status at eval: Patient has poor release with no sensation of release following pelvic floor activation. Current:  Patient was instructed on diaphragmatic breathing with awareness of movement of the pelvic floor.  Did not assess pelvic floor internally today 2022 progressing     Patient will demonstrate proper toileting posture and techniques to aid in bowel movement, with 50% decrease in report of pain and straining. Status at eval: standard toileting posture, pain and pressure with bowel movements  Current: Patient states that she was noticing improvements in bowel movements but states that before her cycle she will note increased constipation; she notes improvement in pain/pressure with bowel movements Progressing 9/20/22     Pt will demonstrate proper toileting posture and perform colon massage daily for improved bowel motility to improve QOL and prevent weakening of PFM due to straining with BMs. Status at eval: BM every 1x/week, Type 1  Current: BM 3x/week, Type 3-4 Progressing 9/20/22     Long term goals: To be achieved in 12 weeks:  Patient will report bladder continence 100% of the time with cough/sneeze/laugh and walking to the toilet. Status at eval: patient stress and urgency incontinence 1-2 times  per week  Current: Patient reports 0-1 times per week Progressing 9/20/22     Patient will tolerate size 3 dilator, dynamic insertion for 10 minutes (or intercourse) with pain no more than 4/10  Status at eval: digital insertion for pelvic floor examination elicits 5/51 pain, intercourse 5/10 pain   Current:  Patient was given an XS vaginal dilator 0.5\" diameter) and instructed on its use. 8/30/2022  Current:  Patient reports using the XS vaginal  with slight discomfort.  Patient did not use this last week secondary to pain with menstruation  9/20/2022 Progressing     Patient will demonstrate improvement of current complaints evidenced by a 9 point  improvement in FOTO, Pelvic functional disability index score  Status at Eval: Pelvic functional disability index urinary 56  Current: Pelvic functional disability index urinary 62  9/20/2022 progressing     Patient will demonstrate independence with management tools and exercise program that are beneficial for current condition in order to feel comfortable with Pelvic floor PT D/C and not fear exacerbation of current condition or symptoms returning. Status at eval : patient fearful of return to exercise and unaware of what activities to avoid to avoid exacerbation of current condition  Current: Patient completing HEP 5x/weekly. Progressing 9/20/22    Summary of Care/ Key Functional Changes: Patient is a 29year old female presenting to Physical Therapy with c/o urge urinary incontinence and pelvic pain. Patient reports improvements in urinary urgency and leakage. She has been working on increase void interval after initial urgency. She has been able to use urge suppression techniques and noting improvements. Patient reports improvement in pelvic pain during her cycle. She has not had improvements in pain with intercourse. Patient would continue to benefit from skilled physical therapy to address short and long term goals.       [x]  Decrease # of leaks    [] No change [x]  Improving [] Resolved      [x]  Decrease hypertonus [] No change [x]  Improving [] Resolved      [x]  Decrease # of pads [] No change [x]  Improving [] Resolved         ASSESSMENT/RECOMMENDATIONS:  [x]Continue therapy per initial plan/protocol at a frequency of  1 x per week for 7 weeks  []Continue therapy with the following recommended changes:_____________________ _____________________________ ________________________________________  []Discontinue therapy progressing towards or have reached established goals  []Discontinue therapy due to lack of appreciable progress towards goals  []Discontinue therapy due to lack of attendance or compliance  []Await Physician's recommendations/decisions regarding therapy  []Other:________________________________________________________________    Thank you for this referral.   Armida Santoyo, PT 9/20/2022 5:40 PM

## 2022-09-27 ENCOUNTER — HOSPITAL ENCOUNTER (OUTPATIENT)
Dept: PHYSICAL THERAPY | Age: 37
End: 2022-09-27
Payer: COMMERCIAL

## 2022-09-27 ENCOUNTER — TELEPHONE (OUTPATIENT)
Dept: PHYSICAL THERAPY | Age: 37
End: 2022-09-27

## 2022-10-11 ENCOUNTER — HOSPITAL ENCOUNTER (OUTPATIENT)
Dept: PHYSICAL THERAPY | Age: 37
Discharge: HOME | End: 2022-10-11
Payer: COMMERCIAL

## 2022-10-11 PROCEDURE — 97140 MANUAL THERAPY 1/> REGIONS: CPT

## 2022-10-11 PROCEDURE — 97110 THERAPEUTIC EXERCISES: CPT

## 2022-10-11 PROCEDURE — 97112 NEUROMUSCULAR REEDUCATION: CPT

## 2022-10-11 NOTE — PROGRESS NOTES
PT DAILY TREATMENT NOTE    Patient Name: Dev Fee  Date:10/11/2022  : 1985  [x]  Patient  Verified  Payor: BLUE BETTY / Plan: Lux Bradford [id number] PPO / Product Type: PPO /    In time:5:00  Out time:5:45  Total Treatment Time (min): 45  Total Timed Codes (min): 45  1:1 Treatment Time (MC/BCBS only): 45  Visit #: 7 of 12    Treatment Dx: Pelvic pain [R10.2]    SUBJECTIVE  Pain Level (0-10 scale): 0  Any medication changes, allergies to medications, adverse drug reactions, diagnosis change, or new procedure performed?: [x] No    [] Yes (see summary sheet for update)  Subjective functional status/changes:   [] No changes reported  Pt reports being on her period and having stabbing pains in her lower abdomen, not bad at this moment though. OBJECTIVE    10 min Therapeutic Exercise:  [x] See flow sheet :   Rationale: increase ROM to  eliminate pelvic pain     15 min Neuromuscular Re-education:  [x]  See flow sheet :   Rationale: improve coordination and increase proprioception  to improve the patients ability to relax pelvic floor    20 min Manual Therapy:  intravaginal pelvic floor release all layers   Rationale: decrease pain, increase ROM, and increase tissue extensibility to improve the patients ability to be pain free  The manual therapy interventions were performed at a separate and distinct time from the therapeutic activities interventions. With   [] TE   [] TA   [x] neuro   [] other: Patient Education: [x] Review HEP    [] Progressed/Changed HEP based on:   [] positioning   [] body mechanics   [] transfers   [] heat/ice application    [] other:      Other Objective/Functional Measures: Right OI 2/3 MFR     Pain Level (0-10 scale) post treatment: 0/10    ASSESSMENT/Changes in Function: Patient tolerated treatment session well today. Patient had no complaints with addition of pelvic clock to exercise program to accomplish decreased pain.   Patient continues to make good progress toward goals and would benefit from continued skilled PT intervention to address remaining deficits outlined in goals below. Patient will continue to benefit from skilled PT services to modify and progress therapeutic interventions, address functional mobility deficits, address ROM deficits, address strength deficits, and analyze and address soft tissue restrictions to attain remaining goals. [x]  See Plan of Care  []  See progress note/recertification  []  See Discharge Summary         Progress towards goals / Updated goals:  Short term goals: To be achieved in 6 weeks:  Patient will demonstrate proper dietary/fluid habits and urge suppression strategies that promote bladder and bowel health to aid in management of urinary urgency and incontinence. Status at eval: Patient consuming 40-80 oz of water and unaware of bladder fitness, dietary irritants and urge suppression strategies. Current: Patient consuming 60-80 oz of water. She has been working on urgency strategies and has increased to 3-5 minutes after initial urge Progressing 9/20/22     Patient will report improvement in UI complaints evidence by a decrease in pad usage and/or amount of leakage by 25% to improve QOL. Status at eval: Patient using 1-3 pads (pantiliners) per day, generally dry-damp (amount of leakage). Current: Patient using 0 liners per day goal met 9/20/22     Patient will be able to fully relax pelvic floor to baseline with sensations of release to assist in pelvic floor hypertonicity training. Status at eval: Patient has poor release with no sensation of release following pelvic floor activation. Current:  Patient was instructed on diaphragmatic breathing with awareness of movement of the pelvic floor.  Did not assess pelvic floor internally today 9/20/2022 progressing  Current: 10/4/22 progressing     Patient will demonstrate proper toileting posture and techniques to aid in bowel movement, with 50% decrease in report of pain and straining. Status at eval: standard toileting posture, pain and pressure with bowel movements  Current: Patient states that she was noticing improvements in bowel movements but states that before her cycle she will note increased constipation; she notes improvement in pain/pressure with bowel movements Progressing 9/20/22     Pt will demonstrate proper toileting posture and perform colon massage daily for improved bowel motility to improve QOL and prevent weakening of PFM due to straining with BMs. Status at eval: BM every 1x/week, Type 1  Current: BM 3x/week, Type 3-4 Progressing 9/20/22     Long term goals: To be achieved in 12 weeks:  Patient will report bladder continence 100% of the time with cough/sneeze/laugh and walking to the toilet. Status at eval: patient stress and urgency incontinence 1-2 times  per week  Current: Patient reports 0-1 times per week Progressing 9/20/22     Patient will tolerate size 3 dilator, dynamic insertion for 10 minutes (or intercourse) with pain no more than 4/10  Status at eval: digital insertion for pelvic floor examination elicits 6/51 pain, intercourse 5/10 pain   Current:  Patient was given an XS vaginal dilator 0.5\" diameter) and instructed on its use. 8/30/2022  Current:  Patient reports using the XS vaginal  with slight discomfort.  Patient did not use this last week secondary to pain with menstruation  9/20/2022 Progressing  Current: digital release of pelvic floor and educated on techniques to help relax muscle 10/11/22 progressing     Patient will demonstrate improvement of current complaints evidenced by a 9 point  improvement in FOTO, Pelvic functional disability index score  Status at Eval: Pelvic functional disability index urinary 56  Current: Pelvic functional disability index urinary 62  9/20/2022 progressing     Patient will demonstrate independence with management tools and exercise program that are beneficial for current condition in order to feel comfortable with Pelvic floor PT D/C and not fear exacerbation of current condition or symptoms returning. Status at eval : patient fearful of return to exercise and unaware of what activities to avoid to avoid exacerbation of current condition  Current: Patient completing HEP 5x/weekly.  Progressing 9/20/22    PLAN  []  Upgrade activities as tolerated     [x]  Continue plan of care  []  Update interventions per flow sheet       []  Discharge due to:_  []  Other:_      Dianna Santa, PT 10/11/2022  5:33 PM    Future Appointments   Date Time Provider Benjamin Magaña   10/18/2022  5:00 PM Milan Roll, [address] [facility]   10/25/2022  5:00 PM Milan Roll, [address] [facility]   11/1/2022  5:00 PM Milan Roll, [address] [facility]   11/8/2022  5:00 PM Missael Old, [address] [facility]   11/15/2022  5:00 PM Missael Old, [address] THE [facility]

## 2022-10-18 ENCOUNTER — HOSPITAL ENCOUNTER (OUTPATIENT)
Dept: PHYSICAL THERAPY | Age: 37
Discharge: HOME | End: 2022-10-18
Payer: COMMERCIAL

## 2022-10-18 PROCEDURE — 97112 NEUROMUSCULAR REEDUCATION: CPT

## 2022-10-18 PROCEDURE — 97110 THERAPEUTIC EXERCISES: CPT

## 2022-10-18 PROCEDURE — 97535 SELF CARE MNGMENT TRAINING: CPT

## 2022-10-18 NOTE — PROGRESS NOTES
PT DAILY TREATMENT NOTE    Patient Name: Mihir Ramírez  HGMA:  : 1985  [x]  Patient  Verified  Payor: BLUE CROSS / Plan: [facility] PPO / Product Type: PPO /    In time:5:00  Out time:5:45  Total Treatment Time (min): 45  Total Timed Codes (min): 45  1:1 Treatment Time (MC/BCBS only): 45   Visit #: 8 of 12    Treatment Dx: Pelvic pain [R10.2]    SUBJECTIVE  Pain Level (0-10 scale): 0  Any medication changes, allergies to medications, adverse drug reactions, diagnosis change, or new procedure performed?: [x] No    [] Yes (see summary sheet for update)  Subjective functional status/changes:   [] No changes reported  Pt reports she purchased the pelvic wand as the release last session really helped with her pain while she was on her period. OBJECTIVE    10 min Therapeutic Exercise:  [x] See flow sheet :   Rationale: increase ROM to improve the patients ability to relax abdomen and pelvic floor     15 min Neuromuscular Re-education:  [x]  See flow sheet :   Rationale: to improve lumbopelvic stability for more support of bladder    20 min Self Care: Pt educated on self release of pelvic floor with pelvic wand   Rationale:    to improve pt's ability to relaese her pelvic floor          With   [] TE   [] TA   [] neuro   [x] other: self care Patient Education: [x] Review HEP    [] Progressed/Changed HEP based on:   [] positioning   [] body mechanics   [] transfers   [] heat/ice application    [] other:      Other Objective/Functional Measures: left bulbospongiosis tender to palpation      Pain Level (0-10 scale) post treatment: 0    ASSESSMENT/Changes in Function: Patient tolerated treatment session well today. Patient had no complaints with addition of self release and TA educated to exercise program to accomplish improved pelvic release and core strength respectively.   Patient continues to make good progress toward goals and would benefit from continued skilled PT intervention to address remaining deficits outlined in goals below. Patient will continue to benefit from skilled PT services to modify and progress therapeutic interventions, address functional mobility deficits, address ROM deficits, address strength deficits, analyze and address soft tissue restrictions, analyze and cue movement patterns, analyze and modify body mechanics/ergonomics, and assess and modify postural abnormalities to attain remaining goals. [x]  See Plan of Care  []  See progress note/recertification  []  See Discharge Summary         Progress towards goals / Updated goals:  Short term goals: To be achieved in 6 weeks:  Patient will demonstrate proper dietary/fluid habits and urge suppression strategies that promote bladder and bowel health to aid in management of urinary urgency and incontinence. Status at eval: Patient consuming 40-80 oz of water and unaware of bladder fitness, dietary irritants and urge suppression strategies. Current: Patient consuming 60-80 oz of water. She has been working on urgency strategies and has increased to 3-5 minutes after initial urge Progressing 9/20/22     Patient will report improvement in UI complaints evidence by a decrease in pad usage and/or amount of leakage by 25% to improve QOL. Status at eval: Patient using 1-3 pads (pantiliners) per day, generally dry-damp (amount of leakage). Current: Patient using 0 liners per day goal met 9/20/22     Patient will be able to fully relax pelvic floor to baseline with sensations of release to assist in pelvic floor hypertonicity training. Status at eval: Patient has poor release with no sensation of release following pelvic floor activation. Current:  Patient was instructed on diaphragmatic breathing with awareness of movement of the pelvic floor.  Did not assess pelvic floor internally today 9/20/2022 progressing  Current: 10/4/22 progressing     Patient will demonstrate proper toileting posture and techniques to aid in bowel movement, with 50% decrease in report of pain and straining. Status at eval: standard toileting posture, pain and pressure with bowel movements  Current: Patient states that she was noticing improvements in bowel movements but states that before her cycle she will note increased constipation; she notes improvement in pain/pressure with bowel movements Progressing 9/20/22     Pt will demonstrate proper toileting posture and perform colon massage daily for improved bowel motility to improve QOL and prevent weakening of PFM due to straining with BMs. Status at eval: BM every 1x/week, Type 1  Current: BM 3x/week, Type 3-4 Progressing 9/20/22     Long term goals: To be achieved in 12 weeks:  Patient will report bladder continence 100% of the time with cough/sneeze/laugh and walking to the toilet. Status at eval: patient stress and urgency incontinence 1-2 times  per week  Current: Patient reports 0-1 times per week Progressing 9/20/22     Patient will tolerate size 3 dilator, dynamic insertion for 10 minutes (or intercourse) with pain no more than 4/10  Status at eval: digital insertion for pelvic floor examination elicits 5/02 pain, intercourse 5/10 pain   Current:  Patient was educated on how to self release pelvic floor with wand 10/18/22 progressing     Patient will demonstrate improvement of current complaints evidenced by a 9 point  improvement in FOTO, Pelvic functional disability index score  Status at Eval: Pelvic functional disability index urinary 56  Current: Pelvic functional disability index urinary 62  9/20/2022 progressing     Patient will demonstrate independence with management tools and exercise program that are beneficial for current condition in order to feel comfortable with Pelvic floor PT D/C and not fear exacerbation of current condition or symptoms returning.    Status at eval : patient fearful of return to exercise and unaware of what activities to avoid to avoid exacerbation of current condition  Current: Patient completing HEP 5x/weekly.  Progressing 9/20/22       PLAN  []  Upgrade activities as tolerated     [x]  Continue plan of care  []  Update interventions per flow sheet       []  Discharge due to:_  []  Other:_      Saloni Barnes, PT 10/18/2022  6:41 PM    Future Appointments   Date Time Provider Benjamin Magaña   10/25/2022  5:00 PM Colleen Garcia, [address] [facility]   11/1/2022  5:00 PM Colleen Garcia, PT [facility]   11/8/2022  5:00 PM Colleen Garcia, [address] [facility]   11/15/2022  5:00 PM Colleen Garcia, [address] [facility]

## 2022-10-25 ENCOUNTER — TELEPHONE (OUTPATIENT)
Dept: PHYSICAL THERAPY | Age: 37
End: 2022-10-25

## 2022-10-25 ENCOUNTER — HOSPITAL ENCOUNTER (OUTPATIENT)
Dept: PHYSICAL THERAPY | Age: 37
End: 2022-10-25
Payer: COMMERCIAL

## 2022-11-01 ENCOUNTER — HOSPITAL ENCOUNTER (OUTPATIENT)
Dept: PHYSICAL THERAPY | Age: 37
Discharge: HOME | End: 2022-11-01
Payer: COMMERCIAL

## 2022-11-01 PROCEDURE — 97530 THERAPEUTIC ACTIVITIES: CPT

## 2022-11-01 PROCEDURE — 97140 MANUAL THERAPY 1/> REGIONS: CPT

## 2022-11-01 PROCEDURE — 97112 NEUROMUSCULAR REEDUCATION: CPT

## 2022-11-01 NOTE — PROGRESS NOTES
PT DAILY TREATMENT NOTE    Patient Name: Flores Segura  Date:2022  : 1985  [x]  Patient  Verified  Payor: BLUE CROSS / Plan: [facility] PPO / Product Type: PPO /    In time:5:00  Out time:5:45  Total Treatment Time (min): 45  Total Timed Codes (min): 45  1:1 Treatment Time (MC/BCBS only): 45   Visit #: 9 of 12    Treatment Dx: Pelvic pain [R10.2]    SUBJECTIVE  Pain Level (0-10 scale): 0  Any medication changes, allergies to medications, adverse drug reactions, diagnosis change, or new procedure performed?: [x] No    [] Yes (see summary sheet for update)  Subjective functional status/changes:   [] No changes reported  Pt reports feeling a lot better. OBJECTIVE    20 min Therapeutic Activity: testing for note of progress, pt educated about progress towards POC  []  See flow sheet :   Rationale: improve patient's understanding of impairments and progress towards plan of care     15 min Neuromuscular Re-education:  [x]  See flow sheet :   Rationale: increase strength, improve coordination, and increase proprioception  to improve the patients ability to activate transversus abdominis without compensation     10 min Manual Therapy:  trigger point release of levator ani   Rationale: increase tissue extensibility to improve the patients ability to coordinate pelvic floor  The manual therapy interventions were performed at a separate and distinct time from the therapeutic activities interventions.           With   [] TE   [x] TA   [] neuro   [] other: Patient Education: [x] Review HEP    [] Progressed/Changed HEP based on:   [] positioning   [] body mechanics   [] transfers   [] heat/ice application    [] other:      Other Objective/Functional Measures:     Functional movement screen:   Squat - more left lower extremity weight bearing  Unilateral squat - good on right, mild eccentric quad weakness on left    Right single leg stance - mild trunk lean to right    PERF: 3/5/ - note that patient only had very mild tenderness to palpation and left side levator ani muscles upon internal assessment    Pain Level (0-10 scale) post treatment: 0    ASSESSMENT/Changes in Function: Patient tolerated treatment session well today. Patient had no complaints with addition of theraband pull down to marching to exercise program to accomplish improved transversus abdominis activation. Patient continues to make excellent progress toward goals and would benefit from continued skilled PT intervention to address remaining deficits outlined in goals below. Patient will continue to benefit from skilled PT services to modify and progress therapeutic interventions, address functional mobility deficits, address ROM deficits, address strength deficits, analyze and address soft tissue restrictions, analyze and cue movement patterns, analyze and modify body mechanics/ergonomics, and assess and modify postural abnormalities to attain remaining goals. [x]  See Plan of Care  []  See progress note/recertification  []  See Discharge Summary         Progress towards goals / Updated goals:  Short term goals: To be achieved in 6 weeks:  Patient will demonstrate proper dietary/fluid habits and urge suppression strategies that promote bladder and bowel health to aid in management of urinary urgency and incontinence. Status at eval: Patient consuming 40-80 oz of water and unaware of bladder fitness, dietary irritants and urge suppression strategies. Last progress note: Patient consuming 60-80 oz of water. She has been working on urgency strategies and has increased to 3-5 minutes after initial urge Progressing 9/20/22  Current: pt is able to void every 3-4 hours, consuming 60- 80 oz of water/day 11/1/2022 goal met     Patient will report improvement in UI complaints evidence by a decrease in pad usage and/or amount of leakage by 25% to improve QOL.   Status at eval: Patient using 1-3 pads (pantiliners) per day, generally dry-damp (amount of leakage). Current: Patient using 0 liners per day goal met 9/20/22     Patient will be able to fully relax pelvic floor to baseline with sensations of release to assist in pelvic floor hypertonicity training. Status at eval: Patient has poor release with no sensation of release following pelvic floor activation. Last progress note:  Patient was instructed on diaphragmatic breathing with awareness of movement of the pelvic floor. Did not assess pelvic floor internally today 9/20/2022 progressing  Current: Patient is able to relax pelvic floor well after endurance contractions but not after a few quick contractions 11/1/2022 progressing     Patient will demonstrate proper toileting posture and techniques to aid in bowel movement, with 50% decrease in report of pain and straining. Status at eval: standard toileting posture, pain and pressure with bowel movements  Last progress note: Patient states that she was noticing improvements in bowel movements but states that before her cycle she will note increased constipation; she notes improvement in pain/pressure with bowel movements Progressing 9/20/22  Current: pt doesn't feel like she's been straining over last week or 2, she's been having bowel movements 4 days/week, was going only one a week at eval 11/1/2022 goal met     Pt will demonstrate proper toileting posture and perform colon massage daily for improved bowel motility to improve QOL and prevent weakening of PFM due to straining with BMs. Status at [facility]: BM every 1x/week, Type 1  Last progress note: BM 3x/week, Type 3-4 Progressing 9/20/22  Current: pt is having bowel movements 4 days/week 11/1/2022 goal met     Long term goals: To be achieved in 12 weeks:  Patient will report bladder continence 100% of the time with cough/sneeze/laugh and walking to the toilet.    Status at eval: patient stress and urgency incontinence 1-2 times  per week  Current: Patient reports 0-1 times per week Progressing 9/20/22  Current: no leaking 11/1/2022 goal met     Patient will tolerate size 3 dilator, dynamic insertion for 10 minutes (or intercourse) with pain no more than 4/10  Status at eval: digital insertion for pelvic floor examination elicits 5/22 pain, intercourse 5/10 pain   Current:  Patient is using self wand for pelvic floor release and wasn't very tender to palpation at her examination today 11/1/2022 progressing     Patient will demonstrate improvement of current complaints evidenced by a 9 point  improvement in FOTO, Pelvic functional disability index score  Status at Eval: Pelvic functional disability index urinary 56  Last progress note: Pelvic functional disability index urinary 62  9/20/2022 progressing  Current: PFDI urinary 75 goal met 11/1/2022    Patient will demonstrate independence with management tools and exercise program that are beneficial for current condition in order to feel comfortable with Pelvic floor PT D/C and not fear exacerbation of current condition or symptoms returning. Status at eval : patient fearful of return to exercise and unaware of what activities to avoid to avoid exacerbation of current condition  Last progress note: Patient completing HEP 5x/weekly.  Progressing 9/20/22  Current: patient is performing her HEP regularly 11/1/2022 goal met       PLAN  []  Upgrade activities as tolerated     [x]  Continue plan of care  []  Update interventions per flow sheet       []  Discharge due to:_  []  Other:_      Althea Charles, PT 11/1/2022  3:37 PM    Future Appointments   Date Time Provider Benjamin Magaña   11/1/2022  5:00 PM Lendell Cogan, [address] [facility]   11/8/2022  5:00 PM Lendell Cogan, [address] [facility]   11/15/2022  5:00 PM Lendell Cogan, [address] [facility]

## 2022-11-01 NOTE — PROGRESS NOTES
[facility]  [address] [facility]  [facility], [address]  Ph [phone]  Fx [phone]    Pelvic Floor Progress Note  Patient name: Mihir Ramírez Start of Care: 2022   Referral source: Billy Lopez MD : 1985   Medical/Treatment Diagnosis: Pelvic pain [R10.2] Onset Date:2020     Prior Hospitalization: see medical history Provider#: [id number]   Medications: Verified on Patient Summary List    Comorbidities: endometriosis  Prior Level of Function:Able to have intercourse without pain, no urinary urgency        Visits from Start of Care: 9    Missed Visits: 1    Updated Goals/Measure of Progress: to be achieved in 3 weeks    Patient will demonstrate proper dietary/fluid habits and urge suppression strategies that promote bladder and bowel health to aid in management of urinary urgency and incontinence. Status at eval: Patient consuming 40-80 oz of water and unaware of bladder fitness, dietary irritants and urge suppression strategies. Last progress note: Patient consuming 60-80 oz of water. She has been working on urgency strategies and has increased to 3-5 minutes after initial urge Progressing 22  Current: pt is able to void every 3-4 hours, consuming 60- 80 oz of water/day 2022 goal met     Patient will report improvement in UI complaints evidence by a decrease in pad usage and/or amount of leakage by 25% to improve QOL. Status at eval: Patient using 1-3 pads (pantiliners) per day, generally dry-damp (amount of leakage). Current: Patient using 0 liners per day goal met 22     Patient will be able to fully relax pelvic floor to baseline with sensations of release to assist in pelvic floor hypertonicity training. Status at eval: Patient has poor release with no sensation of release following pelvic floor activation. Last progress note:  Patient was instructed on diaphragmatic breathing with awareness of movement of the pelvic floor.  Did not assess pelvic floor internally today 9/20/2022 progressing  Current: Patient is able to relax pelvic floor well after endurance contractions but not after a few quick contractions 11/1/2022 progressing     Patient will demonstrate proper toileting posture and techniques to aid in bowel movement, with 50% decrease in report of pain and straining. Status at [facility]: standard toileting posture, pain and pressure with bowel movements  Last progress note: Patient states that she was noticing improvements in bowel movements but states that before her cycle she will note increased constipation; she notes improvement in pain/pressure with bowel movements Progressing 9/20/22  Current: pt doesn't feel like she's been straining over last week or 2, she's been having bowel movements 4 days/week, was going only one a week at [facility] 11/1/2022 goal met     Pt will demonstrate proper toileting posture and perform colon massage daily for improved bowel motility to improve QOL and prevent weakening of PFM due to straining with BMs. Status at [facility]: BM every 1x/week, Type 1  Last progress note: BM 3x/week, Type 3-4 Progressing 9/20/22  Current: pt is having bowel movements 4 days/week 11/1/2022 goal met     Patient will report bladder continence 100% of the time with cough/sneeze/laugh and walking to the toilet.    Status at [facility]: patient stress and urgency incontinence 1-2 times  per week  Current: Patient reports 0-1 times per week Progressing 9/20/22  Current: no leaking 11/1/2022 goal met     Patient will tolerate size 3 dilator, dynamic insertion for 10 minutes (or intercourse) with pain no more than 4/10  Status at [facility]: digital insertion for pelvic floor examination elicits 9/65 pain, intercourse 5/10 pain   Current:  Patient is using self wand for pelvic floor release and wasn't very tender to palpation at her examination today 11/1/2022 progressing     Patient will demonstrate improvement of current complaints evidenced by a 9 point improvement in FOTO, Pelvic functional disability index score  Status at Eval: Pelvic functional disability index urinary 56  Last progress note: Pelvic functional disability index urinary 62  9/20/2022 progressing  Current: PFDI urinary 75 goal met 11/1/2022     Patient will demonstrate independence with management tools and exercise program that are beneficial for current condition in order to feel comfortable with Pelvic floor PT D/C and not fear exacerbation of current condition or symptoms returning. Status at eval : patient fearful of return to exercise and unaware of what activities to avoid to avoid exacerbation of current condition  Last progress note: Patient completing HEP 5x/weekly.  Progressing 9/20/22  Current: patient is performing her HEP regularly 11/1/2022 goal met      Key Functional Changes:            Excellent Good         Limited           None  [x] Increase Pelvic MM strength       []  [x]  []  []  [x] Decrease Pelvic MM hypertonus     []  [x]  []  []  [x] Decrease Incontinence Episodes    [x]  []  []  []   [x] Improve Voiding Habits        [x]  []  []  []   [x] Decreased Urgency        [x]  []  []  []  [x] Decrease Pelvic Pain        [x]  []  []  []      ASSESSMENT/RECOMMENDATIONS:  [x]Continue therapy per initial plan/protocol at a frequency of  1 x per week for 3 weeks  []Continue therapy with the following recommended changes:_____________________      _____________________________________________________________________  []Discontinue therapy progressing towards or have reached established goals  []Discontinue therapy due to lack of appreciable progress towards goals  []Discontinue therapy due to lack of attendance or compliance  []Await Physician's recommendations/decisions regarding therapy  []Other:________________________________________________________________    Thank you for this referral.   Minna May, PT 11/1/2022 7:04 PM  NOTE TO PHYSICIAN:  [address]   FAX TO [facility]: [phone]  If you are unable to process this request in 24 hours please contact our office: [phone]      I have read the above report and request that my patient continue as recommended. I have read the above report and request that my patient continue therapy with the following changes/special instructions:___________________________________________________________  I have read the above report and request that my patient be discharged from therapy.

## 2022-11-08 ENCOUNTER — HOSPITAL ENCOUNTER (OUTPATIENT)
Dept: PHYSICAL THERAPY | Age: 37
Discharge: HOME | End: 2022-11-08
Payer: COMMERCIAL

## 2022-11-08 PROCEDURE — 97112 NEUROMUSCULAR REEDUCATION: CPT

## 2022-11-08 PROCEDURE — 97140 MANUAL THERAPY 1/> REGIONS: CPT

## 2022-11-08 NOTE — PROGRESS NOTES
PT DAILY TREATMENT NOTE    Patient Name: Rebecca Narayan  Date:2022  : 1985  [x]  Patient  Verified  Payor: BLUE BETTY / Plan: [employer] [id number] PPO / Product Type: PPO /    In time:5:00  Out time:5:45  Total Treatment Time (min): 45  Total Timed Codes (min): 45  1:1 Treatment Time (MC/BCBS only): 45   Visit #: 10 of 12    Treatment Dx: Pelvic pain [R10.2]    SUBJECTIVE  Pain Level (0-10 scale): 2  Any medication changes, allergies to medications, adverse drug reactions, diagnosis change, or new procedure performed?: [x] No    [] Yes (see summary sheet for update)  Subjective functional status/changes:   [] No changes reported  Pt reports she is having some cramps but also pain in the right side of her chest today. OBJECTIVE     20 min Neuromuscular Re-education:  [x]  See flow sheet :   Rationale: increase ROM, increase strength, improve coordination, and increase proprioception  to improve the patients ability to support core and upper back    25 min Manual Therapy:  MFR lats, pec minor, subscap, active release technique for all above muscles, acromioclavicular inferior glide MWM with shoulder flexion AROM   Rationale: decrease pain, increase ROM, and increase tissue extensibility to improve the patients ability to decrease pain and guarding  The manual therapy interventions were performed at a separate and distinct time from the therapeutic activities interventions. With   [] TE   [] TA   [x] neuro   [] other: Patient Education: [] Review HEP    [x] Progressed/Changed HEP based on:   [x] positioning   [] body mechanics   [] transfers   [] heat/ice application    [] other:      Other Objective/Functional Measures: right scapula abducted and anteriorly tipped, tight right pec minor, upper trap and lat     Pain Level (0-10 scale) post treatment: 0    ASSESSMENT/Changes in Function: Patient tolerated treatment session well today.  Patient had no complaints with addition of wall to exercise program to accomplish upper back strengthening. Patient continues to make good progress toward goals and would benefit from continued skilled PT intervention to address remaining deficits outlined in goals below. Patient will continue to benefit from skilled PT services to modify and progress therapeutic interventions, address functional mobility deficits, address ROM deficits, address strength deficits, analyze and address soft tissue restrictions, analyze and cue movement patterns, analyze and modify body mechanics/ergonomics, assess and modify postural abnormalities, and instruct in home and community integration to attain remaining goals. [x]  See Plan of Care  []  See progress note/recertification  []  See Discharge Summary         Progress towards goals / Updated goals:     Patient will be able to fully relax pelvic floor to baseline with sensations of release to assist in pelvic floor hypertonicity training. Status at eval: Patient has poor release with no sensation of release following pelvic floor activation.   Last Progress Note: Patient is able to relax pelvic floor well after endurance contractions but not after a few quick contractions 11/1/2022 progressing    Patient will tolerate size 3 dilator, dynamic insertion for 10 minutes (or intercourse) with pain no more than 4/10  Status at eval: digital insertion for pelvic floor examination elicits 9/49 pain, intercourse 5/10 pain   Current:  Patient is using self wand for pelvic floor release and wasn't very tender to palpation at her examination today 11/1/2022 progressing    PLAN  []  Upgrade activities as tolerated     [x]  Continue plan of care  []  Update interventions per flow sheet       []  Discharge due to:_  []  Other:_      Shilpa Valdes, PT 11/8/2022  5:07 PM    Future Appointments   Date Time Provider Benjamin Magaña   11/15/2022  5:00 PM Rashawn Chandler, [address] [facility]

## 2022-11-15 ENCOUNTER — HOSPITAL ENCOUNTER (OUTPATIENT)
Dept: PHYSICAL THERAPY | Age: 37
End: 2022-11-15
Payer: COMMERCIAL

## 2022-11-15 NOTE — PROGRESS NOTES
PT DAILY TREATMENT NOTE    Patient Name: Telma Pedersen  Date:11/15/2022  : 1985  [x]  Patient  Verified  Payor: BLUE CROSS / Plan: [facility] PPO / Product Type: PPO /    In time:***  Out time:***  Total Treatment Time (min): ***  Total Timed Codes (min): ***  1:1 Treatment Time (MC/BCBS only): ***   Visit #: *** of ***    Treatment Dx: Pelvic pain [R10.2]    SUBJECTIVE  Pain Level (0-10 scale): ***  Any medication changes, allergies to medications, adverse drug reactions, diagnosis change, or new procedure performed?: [x] No    [] Yes (see summary sheet for update)  Subjective functional status/changes:   [] No changes reported  ***    OBJECTIVE    Modalities Rationale:     {[facility] INMOTION MODALITIES:08289} to improve patient's ability to ***   min [] Estim, type/location:                                      []  att     []  unatt     []  w/US     []  w/ice    []  w/heat    min []  Mechanical Traction: type/lbs                   []  pro   []  sup   []  int   []  cont    []  before manual    []  after manual    min []  Ultrasound, settings/location:      min []  Iontophoresis w/ dexamethasone, location:                                               []  take home patch       []  in clinic    min []  Ice     []  Heat    location/position:     min []  Vasopneumatic Device, press/temp:    If using vaso (only need to measure limb vaso being performed on)      pre-treatment girth :       post-treatment girth :       measured at (landmark location) :      min []  Other:    [] Skin assessment post-treatment (if applicable):    []  intact    []  redness- no adverse reaction                  []redness - adverse reaction:          *** min []Eval                  []Re-Eval       *** min Therapeutic Exercise:  [] See flow sheet :   Rationale: {[facility] IMMOTION THER EX:44610:a} to improve the patients ability to ***    *** min Therapeutic Activity:  []  See flow sheet :   Rationale: {[facility] IMMOTION THER EX:92915:a}  to improve the patients ability to ***     *** min Neuromuscular Re-education:  []  See flow sheet :   Rationale: {[facility] IMMOTION THER EX:89020:a}  to improve the patients ability to ***    *** min Manual Therapy:  ***   Rationale: {[facility] IMMOTION MANUAL THERAPY:56856:a} to improve the patients ability to ***  The manual therapy interventions were performed at a separate and distinct time from the therapeutic activities interventions. min Gait Training:  ___ feet with ___ device on level surfaces with ___ level of assistance   Rationale: To improve ambulation safety and efficiency in order to improve patient's ability to safely ambulate at home for self care. min Self Care:    Rationale:    {[facility] IMMOTION THER EX:69719} to improve the patients ability to ***          With   [] TE   [] TA   [] neuro   [] other: Patient Education: [x] Review HEP    [] Progressed/Changed HEP based on:   [] positioning   [] body mechanics   [] transfers   [] heat/ice application    [] other:      Other Objective/Functional Measures: ***     Pain Level (0-10 scale) post treatment: ***    ASSESSMENT/Changes in Function: Patient tolerated treatment session *** today. Patient had no complaints with addition of *** to exercise program to accomplish ***. ***. Patient continues to make *** progress toward goals and would benefit from continued skilled PT intervention to address remaining deficits outlined in goals below. Patient will continue to benefit from skilled PT services to {[facility] INMOTION ASSESSMENT STATEMENTS:10251:a} to attain remaining goals. [x]  See Plan of Care  []  See progress note/recertification  []  See Discharge Summary         Progress towards goals / Updated goals:  Patient will demonstrate proper dietary/fluid habits and urge suppression strategies that promote bladder and bowel health to aid in management of urinary urgency and incontinence.   Status at eval: Patient consuming 40-80 oz of water and unaware of bladder fitness, dietary irritants and urge suppression strategies. Last progress note: Patient consuming 60-80 oz of water. She has been working on urgency strategies and has increased to 3-5 minutes after initial urge Progressing 9/20/22  Current: pt is able to void every 3-4 hours, consuming 60- 80 oz of water/day 11/1/2022 goal met     Patient will report improvement in UI complaints evidence by a decrease in pad usage and/or amount of leakage by 25% to improve QOL. Status at [facility]: Patient using 1-3 pads (pantiliners) per day, generally dry-damp (amount of leakage). Current: Patient using 0 liners per day goal met 9/20/22     Patient will be able to fully relax pelvic floor to baseline with sensations of release to assist in pelvic floor hypertonicity training. Status at [facility]: Patient has poor release with no sensation of release following pelvic floor activation. Last progress note:  Patient was instructed on diaphragmatic breathing with awareness of movement of the pelvic floor. Did not assess pelvic floor internally today 9/20/2022 progressing  Current: Patient is able to relax pelvic floor well after endurance contractions but not after a few quick contractions 11/1/2022 progressing     Patient will demonstrate proper toileting posture and techniques to aid in bowel movement, with 50% decrease in report of pain and straining.   Status at [facility]: standard toileting posture, pain and pressure with bowel movements  Last progress note: Patient states that she was noticing improvements in bowel movements but states that before her cycle she will note increased constipation; she notes improvement in pain/pressure with bowel movements Progressing 9/20/22  Current: pt doesn't feel like she's been straining over last week or 2, she's been having bowel movements 4 days/week, was going only one a week at [facility] 11/1/2022 goal met     Pt will demonstrate proper toileting posture and perform colon massage daily for improved bowel motility to improve QOL and prevent weakening of PFM due to straining with BMs. Status at eval: BM every 1x/week, Type 1  Last progress note: BM 3x/week, Type 3-4 Progressing 9/20/22  Current: pt is having bowel movements 4 days/week 11/1/2022 goal met     Patient will report bladder continence 100% of the time with cough/sneeze/laugh and walking to the toilet. Status at eval: patient stress and urgency incontinence 1-2 times  per week  Current: Patient reports 0-1 times per week Progressing 9/20/22  Current: no leaking 11/1/2022 goal met     Patient will tolerate size 3 dilator, dynamic insertion for 10 minutes (or intercourse) with pain no more than 4/10  Status at eval: digital insertion for pelvic floor examination elicits 5/40 pain, intercourse 5/10 pain   Current:  Patient is using self wand for pelvic floor release and wasn't very tender to palpation at her examination today 11/1/2022 progressing     Patient will demonstrate improvement of current complaints evidenced by a 9 point  improvement in FOTO, Pelvic functional disability index score  Status at Eval: Pelvic functional disability index urinary 56  Last progress note: Pelvic functional disability index urinary 62  9/20/2022 progressing  Current: PFDI urinary 75 goal met 11/1/2022     Patient will demonstrate independence with management tools and exercise program that are beneficial for current condition in order to feel comfortable with Pelvic floor PT D/C and not fear exacerbation of current condition or symptoms returning. Status at eval : patient fearful of return to exercise and unaware of what activities to avoid to avoid exacerbation of current condition  Last progress note: Patient completing HEP 5x/weekly.  Progressing 9/20/22  Current: patient is performing her HEP regularly 11/1/2022 goal met    PLAN  []  Upgrade activities as tolerated     [x]  Continue plan of care  []  Update interventions per flow sheet       []  Discharge due to:_  []  Other:_      Edwin Sin PT 11/15/2022  1:13 PM    Future Appointments   Date Time Provider Benjamin Magaña   11/15/2022  5:00 PM Ac Cornell PT [facility]   12/1/2022  3:30 PM Ac Cornell PT [facility]

## 2022-12-01 ENCOUNTER — HOSPITAL ENCOUNTER (OUTPATIENT)
Dept: PHYSICAL THERAPY | Age: 37
Discharge: HOME | End: 2022-12-01
Payer: COMMERCIAL

## 2022-12-01 PROCEDURE — 97112 NEUROMUSCULAR REEDUCATION: CPT

## 2022-12-01 PROCEDURE — 97530 THERAPEUTIC ACTIVITIES: CPT

## 2022-12-01 NOTE — PROGRESS NOTES
PT DAILY TREATMENT NOTE    Patient Name: Telma Pedersen  Date:2022  : 1985  [x]  Patient  Verified  Payor: BLUE CROSS / Plan: [facility] PPO / Product Type: PPO /    In time:3:30  Out time:4:10  Total Treatment Time (min): 40  Total Timed Codes (min): 40  1:1 Treatment Time (MC/BCBS only): 40   Visit #: 11 of 12    Treatment Dx: Pelvic pain [R10.2]    SUBJECTIVE  Pain Level (0-10 scale): 0  Any medication changes, allergies to medications, adverse drug reactions, diagnosis change, or new procedure performed?: [x] No    [] Yes (see summary sheet for update)  Subjective functional status/changes:   [] No changes reported  Pt reports her right pec still hurts a little, was better when she wore a sports bras. She is having no urinary or pelvic symptoms. OBJECTIVE    15 min Therapeutic Activity: testing []  See flow sheet :   Rationale: to understand impairments and progress     25 min Neuromuscular Re-education:  [x]  See flow sheet :   Rationale: increase strength, improve coordination, and increase proprioception  to improve the patients ability to activate core and pelvic floor          With   [] TE   [] TA   [x] neuro   [] other: Patient Education: [x] Review HEP    [] Progressed/Changed HEP based on:   [] positioning   [] body mechanics   [] transfers   [] heat/ice application    [] other:      Other Objective/Functional Measures: functional testing - squat good, unilateral squat + bilateral lower extremity valgus mild L > R, active straight leg raise right 2+, left 4; good balance and heel raise     Pain Level (0-10 scale) post treatment: 0    ASSESSMENT/Changes in Function: Patient tolerated treatment session well today. Patient had no complaints with addition of running man, standing march holds and lateral band walks to exercise program to accomplish improved glut and core stability.   Patient continues to make exellent progress toward goals and would benefit from continued skilled PT intervention to address remaining deficits outlined in goals below. Patient will continue to benefit from skilled PT services to modify and progress therapeutic interventions, address functional mobility deficits, address ROM deficits, address strength deficits, analyze and address soft tissue restrictions, analyze and cue movement patterns, analyze and modify body mechanics/ergonomics, assess and modify postural abnormalities, and instruct in home and community integration to attain remaining goals. [x]  See Plan of Care  []  See progress note/recertification  []  See Discharge Summary         Progress towards goals / Updated goals:  Patient will demonstrate proper dietary/fluid habits and urge suppression strategies that promote bladder and bowel health to aid in management of urinary urgency and incontinence. Status at eval: Patient consuming 40-80 oz of water and unaware of bladder fitness, dietary irritants and urge suppression strategies. Last progress note: Patient consuming 60-80 oz of water. She has been working on urgency strategies and has increased to 3-5 minutes after initial urge Progressing 9/20/22  Current: pt is able to void every 3-4 hours, consuming 60- 80 oz of water/day 11/1/2022 goal met     Patient will report improvement in UI complaints evidence by a decrease in pad usage and/or amount of leakage by 25% to improve QOL. Status at eval: Patient using 1-3 pads (pantiliners) per day, generally dry-damp (amount of leakage). Current: Patient using 0 liners per day goal met 9/20/22     Patient will be able to fully relax pelvic floor to baseline with sensations of release to assist in pelvic floor hypertonicity training. Status at eval: Patient has poor release with no sensation of release following pelvic floor activation. Last progress note:  Patient was instructed on diaphragmatic breathing with awareness of movement of the pelvic floor.  Did not assess pelvic floor internally today 9/20/2022 progressing  Current: Patient is able to relax pelvic floor well after endurance contractions but not after a few quick contractions 11/1/2022 progressing     Patient will demonstrate proper toileting posture and techniques to aid in bowel movement, with 50% decrease in report of pain and straining. Status at [facility]: standard toileting posture, pain and pressure with bowel movements  Last progress note: Patient states that she was noticing improvements in bowel movements but states that before her cycle she will note increased constipation; she notes improvement in pain/pressure with bowel movements Progressing 9/20/22  Current: pt doesn't feel like she's been straining over last week or 2, she's been having bowel movements 4 days/week, was going only one a week at [facility] 11/1/2022 goal met     Pt will demonstrate proper toileting posture and perform colon massage daily for improved bowel motility to improve QOL and prevent weakening of PFM due to straining with BMs. Status at [facility]: BM every 1x/week, Type 1  Last progress note: BM 3x/week, Type 3-4 Progressing 9/20/22  Current: pt is having bowel movements 4 days/week 11/1/2022 goal met     Patient will report bladder continence 100% of the time with cough/sneeze/laugh and walking to the toilet.    Status at [facility]: patient stress and urgency incontinence 1-2 times  per week  Current: Patient reports 0-1 times per week Progressing 9/20/22  Current: no leaking 11/1/2022 goal met     Patient will tolerate size 3 dilator, dynamic insertion for 10 minutes (or intercourse) with pain no more than 4/10  Status at [facility]: digital insertion for pelvic floor examination elicits 8/49 pain, intercourse 5/10 pain   Current:  Patient is using self wand for pelvic floor release and wasn't very tender to palpation at her examination today 11/1/2022 progressing     Patient will demonstrate improvement of current complaints evidenced by a 9 point  improvement in FOTO, Pelvic functional disability index score  Status at Eval: Pelvic functional disability index urinary 56  Last progress note: Pelvic functional disability index urinary 62  9/20/2022 progressing  Current: PFDI urinary 75 goal met 11/1/2022     Patient will demonstrate independence with management tools and exercise program that are beneficial for current condition in order to feel comfortable with Pelvic floor PT D/C and not fear exacerbation of current condition or symptoms returning. Status at eval : patient fearful of return to exercise and unaware of what activities to avoid to avoid exacerbation of current condition  Last progress note: Patient completing HEP 5x/weekly. Progressing 9/20/22  Current: patient is performing her HEP regularly 11/1/2022 goal met       PLAN  []  Upgrade activities as tolerated     [x]  Continue plan of care  []  Update interventions per flow sheet       []  Discharge due to:_  []  Other:_      Jesse Garcia, PT 12/1/2022  3:41 PM    No future appointments.

## 2022-12-15 ENCOUNTER — APPOINTMENT (OUTPATIENT)
Dept: PHYSICAL THERAPY | Age: 37
End: 2022-12-15
Payer: COMMERCIAL

## 2023-01-04 ENCOUNTER — HOSPITAL ENCOUNTER (OUTPATIENT)
Dept: PHYSICAL THERAPY | Age: 38
Discharge: HOME | End: 2023-01-04
Payer: COMMERCIAL

## 2023-01-04 PROCEDURE — 97112 NEUROMUSCULAR REEDUCATION: CPT

## 2023-01-04 PROCEDURE — 97530 THERAPEUTIC ACTIVITIES: CPT

## 2023-01-04 PROCEDURE — 97110 THERAPEUTIC EXERCISES: CPT

## 2023-01-04 NOTE — THERAPY DISCHARGE
[facility]  [address] [place], [address]  Ph [phone]  Fx [phone]    Physical Therapy Discharge Summary    Patient name: Kyara Wheat Start of Care: 22   Referral source: Dana Mckenna MD : 1985   Medical/Treatment Diagnosis: Pelvic pain [R10.2] Onset Date:2020     Prior Hospitalization: see medical history Provider#: [id number]   Medications: Verified on Patient Summary List    Comorbidities: endometriosis  Prior Level of Function:Able to have intercourse without pain, no urinary urgency      Visits from Start of Care: 12    Missed Visits: 0    Goals/Measure of Progress:  Progress towards goals / Updated goals:  Patient will demonstrate proper dietary/fluid habits and urge suppression strategies that promote bladder and bowel health to aid in management of urinary urgency and incontinence. Status at eval: Patient consuming 40-80 oz of water and unaware of bladder fitness, dietary irritants and urge suppression strategies. Last progress note: Patient consuming 60-80 oz of water. She has been working on urgency strategies and has increased to 3-5 minutes after initial urge Progressing 22  Current: pt is able to void every 3-4 hours, consuming 60- 80 oz of water/day 2022 goal met     Patient will report improvement in UI complaints evidence by a decrease in pad usage and/or amount of leakage by 25% to improve QOL. Status at eval: Patient using 1-3 pads (pantiliners) per day, generally dry-damp (amount of leakage). Current: Patient using 0 liners per day goal met 22     Patient will be able to fully relax pelvic floor to baseline with sensations of release to assist in pelvic floor hypertonicity training. Status at eval: Patient has poor release with no sensation of release following pelvic floor activation. Last progress note:  Patient was instructed on diaphragmatic breathing with awareness of movement of the pelvic floor.  Did not assess pelvic floor internally today 9/20/2022 progressing  Current: Patient is able to relax pelvic floor well after endurance contractions but not after a few quick contractions 11/1/2022 goal met     Patient will demonstrate proper toileting posture and techniques to aid in bowel movement, with 50% decrease in report of pain and straining. Status at [facility]: standard toileting posture, pain and pressure with bowel movements  Last progress note: Patient states that she was noticing improvements in bowel movements but states that before her cycle she will note increased constipation; she notes improvement in pain/pressure with bowel movements Progressing 9/20/22  Current: pt doesn't feel like she's been straining over last week or 2, she's been having bowel movements 4 days/week, was going only one a week at [facility] 11/1/2022 goal met     Pt will demonstrate proper toileting posture and perform colon massage daily for improved bowel motility to improve QOL and prevent weakening of PFM due to straining with BMs. Status at [facility]: BM every 1x/week, Type 1  Last progress note: BM 3x/week, Type 3-4 Progressing 9/20/22  Current: pt is having bowel movements 4 days/week 11/1/2022 goal met     Patient will report bladder continence 100% of the time with cough/sneeze/laugh and walking to the toilet.    Status at eval: patient stress and urgency incontinence 1-2 times  per week  Current: Patient reports 0-1 times per week Progressing 9/20/22  Current: no leaking 11/1/2022 goal met     Patient will tolerate size 3 dilator, dynamic insertion for 10 minutes (or intercourse) with pain no more than 4/10  Status at [facility]: digital insertion for pelvic floor examination elicits 6/52 pain, intercourse 5/10 pain   Current:  pt reports she has no pain 1/4/23 goal met     Patient will demonstrate improvement of current complaints evidenced by a 9 point  improvement in FOTO, Pelvic functional disability index score  Status at [facility]: Pelvic functional disability index urinary 56  Last progress note: Pelvic functional disability index urinary 62  9/20/2022 progressing  Current: PFDI urinary 75 goal met 11/1/2022     Patient will demonstrate independence with management tools and exercise program that are beneficial for current condition in order to feel comfortable with Pelvic floor PT D/C and not fear exacerbation of current condition or symptoms returning. Status at eval : patient fearful of return to exercise and unaware of what activities to avoid to avoid exacerbation of current condition  Last progress note: Patient completing HEP 5x/weekly. Progressing 9/20/22  Current: patient is performing her HEP regularly 11/1/2022 goal met    Assessment/ Summary of Care: Patient tolerated treatment session well today.  She has met all goals and is ready to be D/Todd at this time    RECOMMENDATIONS:  []Discontinue therapy: [x]Patient has reached or is progressing toward set goals      []Patient is non-compliant or has abdicated      []Due to lack of appreciable progress towards set goals    Kamar Feldman, PT 1/4/2023 4:53 PM

## 2023-01-04 NOTE — ANCILLARY DISCHARGE INSTRUCTIONS
Physical Therapy Discharge Instructions    [facility]  [address] [place], [address]  Ph [phone]  Fx [phone]      Patient: Nilo Vicente  : 1985      Continue Home Exercise Program 3-4 times per week for 12 weeks      Follow up with MD:     [] Upon completion of therapy     [x] As needed      Recommendations:     [x]   Return to activity with home program    []   Return to activity with the following modifications:       []Post Rehab Program    []Join Independent aquatic program     []Return to/join local gym            France Nash, PT 2023 4:52 PM

## 2023-01-04 NOTE — PROGRESS NOTES
PT DAILY TREATMENT NOTE    Patient Name: Natalya Charles  Date:2023  : 1985  [x]  Patient  Verified  Payor: BLUE CROSS / Plan: [facility] PPO / Product Type: PPO /    In time:4:00  Out time:4:40  Total Treatment Time (min): 40  Total Timed Codes (min): 40  1:1 Treatment Time ([facility] only): 40   Visit #: 12 of 12    Treatment Dx: Pelvic pain [R10.2]    SUBJECTIVE  Pain Level (0-10 scale): 0  Any medication changes, allergies to medications, adverse drug reactions, diagnosis change, or new procedure performed?: [x] No    [] Yes (see summary sheet for update)  Subjective functional status/changes:   [] No changes reported  Pt reports feeling great    OBJECTIVE    15 min Therapeutic Exercise:  [x] See flow sheet :   Rationale: increase strength to improve the patients ability to restore PLOF     10 min Therapeutic Activity: testing for D/C []  See flow sheet :   Rationale: see above for rationale     15 min Neuromuscular Re-education:  [x]  See flow sheet :   Rationale: increase strength, improve coordination, and increase proprioception  to improve the patients ability to activate pelvic floor and core          With   [] TE   [x] TA   [] neuro   [] other: Patient Education: [x] Review HEP    [] Progressed/Changed HEP based on:   [] positioning   [] body mechanics   [] transfers   [] heat/ice application    [] other:      Other Objective/Functional Measures: see goals     Pain Level (0-10 scale) post treatment: 0    ASSESSMENT/Changes in Function: Patient tolerated treatment session well today. She has met all goals and is ready to be D/Todd at this time     []  See Plan of Care  []  See progress note/recertification  [x]  See Discharge Summary         Progress towards goals / Updated goals:  Patient will demonstrate proper dietary/fluid habits and urge suppression strategies that promote bladder and bowel health to aid in management of urinary urgency and incontinence.   Status at [facility]: Patient consuming 40-80 oz of water and unaware of bladder fitness, dietary irritants and urge suppression strategies. Last progress note: Patient consuming 60-80 oz of water. She has been working on urgency strategies and has increased to 3-5 minutes after initial urge Progressing 9/20/22  Current: pt is able to void every 3-4 hours, consuming 60- 80 oz of water/day 11/1/2022 goal met     Patient will report improvement in UI complaints evidence by a decrease in pad usage and/or amount of leakage by 25% to improve QOL. Status at [facility]: Patient using 1-3 pads (pantiliners) per day, generally dry-damp (amount of leakage). Current: Patient using 0 liners per day goal met 9/20/22     Patient will be able to fully relax pelvic floor to baseline with sensations of release to assist in pelvic floor hypertonicity training. Status at [facility]: Patient has poor release with no sensation of release following pelvic floor activation. Last progress note:  Patient was instructed on diaphragmatic breathing with awareness of movement of the pelvic floor. Did not assess pelvic floor internally today 9/20/2022 progressing  Current: Patient is able to relax pelvic floor well after endurance contractions but not after a few quick contractions 11/1/2022 goal met     Patient will demonstrate proper toileting posture and techniques to aid in bowel movement, with 50% decrease in report of pain and straining.   Status at [facility]: standard toileting posture, pain and pressure with bowel movements  Last progress note: Patient states that she was noticing improvements in bowel movements but states that before her cycle she will note increased constipation; she notes improvement in pain/pressure with bowel movements Progressing 9/20/22  Current: pt doesn't feel like she's been straining over last week or 2, she's been having bowel movements 4 days/week, was going only one a week at [facility] 11/1/2022 goal met     Pt will demonstrate proper toileting posture and perform colon massage daily for improved bowel motility to improve QOL and prevent weakening of PFM due to straining with BMs. Status at eval: BM every 1x/week, Type 1  Last progress note: BM 3x/week, Type 3-4 Progressing 9/20/22  Current: pt is having bowel movements 4 days/week 11/1/2022 goal met     Patient will report bladder continence 100% of the time with cough/sneeze/laugh and walking to the toilet. Status at eval: patient stress and urgency incontinence 1-2 times  per week  Current: Patient reports 0-1 times per week Progressing 9/20/22  Current: no leaking 11/1/2022 goal met     Patient will tolerate size 3 dilator, dynamic insertion for 10 minutes (or intercourse) with pain no more than 4/10  Status at eval: digital insertion for pelvic floor examination elicits 5/85 pain, intercourse 5/10 pain   Current:  pt reports she has no pain 1/4/23 goal met     Patient will demonstrate improvement of current complaints evidenced by a 9 point  improvement in FOTO, Pelvic functional disability index score  Status at Eval: Pelvic functional disability index urinary 56  Last progress note: Pelvic functional disability index urinary 62  9/20/2022 progressing  Current: PFDI urinary 75 goal met 11/1/2022     Patient will demonstrate independence with management tools and exercise program that are beneficial for current condition in order to feel comfortable with Pelvic floor PT D/C and not fear exacerbation of current condition or symptoms returning. Status at eval : patient fearful of return to exercise and unaware of what activities to avoid to avoid exacerbation of current condition  Last progress note: Patient completing HEP 5x/weekly.  Progressing 9/20/22  Current: patient is performing her HEP regularly 11/1/2022 goal met    PLAN  []  Upgrade activities as tolerated     []  Continue plan of care  []  Update interventions per flow sheet       [x]  Discharge due to: having met all goals  [] Other:_      Kamar Feldman, PT 1/4/2023  9:54 AM    Future Appointments   Date Time Provider Benjamin Magaña   1/4/2023  4:00 PM Aric Paige, PT [facility]